# Patient Record
Sex: MALE | Race: BLACK OR AFRICAN AMERICAN | Employment: OTHER | URBAN - METROPOLITAN AREA
[De-identification: names, ages, dates, MRNs, and addresses within clinical notes are randomized per-mention and may not be internally consistent; named-entity substitution may affect disease eponyms.]

---

## 2017-02-06 ENCOUNTER — OFFICE VISIT (OUTPATIENT)
Dept: FAMILY MEDICINE CLINIC | Facility: CLINIC | Age: 67
End: 2017-02-06

## 2017-02-06 ENCOUNTER — LAB ENCOUNTER (OUTPATIENT)
Dept: LAB | Age: 67
End: 2017-02-06
Attending: FAMILY MEDICINE
Payer: MEDICARE

## 2017-02-06 VITALS
SYSTOLIC BLOOD PRESSURE: 130 MMHG | WEIGHT: 230 LBS | DIASTOLIC BLOOD PRESSURE: 96 MMHG | HEART RATE: 96 BPM | TEMPERATURE: 98 F | RESPIRATION RATE: 17 BRPM | HEIGHT: 68 IN | BODY MASS INDEX: 34.86 KG/M2

## 2017-02-06 DIAGNOSIS — J00 ACUTE NASOPHARYNGITIS: ICD-10-CM

## 2017-02-06 DIAGNOSIS — Z12.5 PROSTATE CANCER SCREENING: ICD-10-CM

## 2017-02-06 DIAGNOSIS — Z00.00 PREVENTATIVE HEALTH CARE: Primary | ICD-10-CM

## 2017-02-06 DIAGNOSIS — R94.31 ABNORMAL EKG: ICD-10-CM

## 2017-02-06 DIAGNOSIS — Z00.00 PREVENTATIVE HEALTH CARE: ICD-10-CM

## 2017-02-06 LAB
ALBUMIN SERPL BCP-MCNC: 4 G/DL (ref 3.5–4.8)
ALBUMIN/GLOB SERPL: 1.1 {RATIO} (ref 1–2)
ALP SERPL-CCNC: 92 U/L (ref 32–100)
ALT SERPL-CCNC: 20 U/L (ref 17–63)
ANION GAP SERPL CALC-SCNC: 7 MMOL/L (ref 0–18)
AST SERPL-CCNC: 18 U/L (ref 15–41)
BACTERIA UR QL AUTO: NEGATIVE /HPF
BASOPHILS # BLD: 0.1 K/UL (ref 0–0.2)
BASOPHILS NFR BLD: 1 %
BILIRUB SERPL-MCNC: 1.1 MG/DL (ref 0.3–1.2)
BILIRUB UR QL: NEGATIVE
BUN SERPL-MCNC: 11 MG/DL (ref 8–20)
BUN/CREAT SERPL: 8.1 (ref 10–20)
CALCIUM SERPL-MCNC: 9.7 MG/DL (ref 8.5–10.5)
CHLORIDE SERPL-SCNC: 104 MMOL/L (ref 95–110)
CHOLEST SERPL-MCNC: 237 MG/DL (ref 110–200)
CLARITY UR: CLEAR
CO2 SERPL-SCNC: 29 MMOL/L (ref 22–32)
COLOR UR: YELLOW
CREAT SERPL-MCNC: 1.36 MG/DL (ref 0.5–1.5)
EOSINOPHIL # BLD: 0.4 K/UL (ref 0–0.7)
EOSINOPHIL NFR BLD: 6 %
ERYTHROCYTE [DISTWIDTH] IN BLOOD BY AUTOMATED COUNT: 15.2 % (ref 11–15)
GLOBULIN PLAS-MCNC: 3.7 G/DL (ref 2.5–3.7)
GLUCOSE SERPL-MCNC: 100 MG/DL (ref 70–99)
GLUCOSE UR-MCNC: NEGATIVE MG/DL
HCT VFR BLD AUTO: 49.3 % (ref 41–52)
HDLC SERPL-MCNC: 43 MG/DL
HGB BLD-MCNC: 16.1 G/DL (ref 13.5–17.5)
KETONES UR-MCNC: NEGATIVE MG/DL
LDLC SERPL CALC-MCNC: 164 MG/DL (ref 0–99)
LEUKOCYTE ESTERASE UR QL STRIP.AUTO: NEGATIVE
LYMPHOCYTES # BLD: 2.6 K/UL (ref 1–4)
LYMPHOCYTES NFR BLD: 39 %
MCH RBC QN AUTO: 28.1 PG (ref 27–32)
MCHC RBC AUTO-ENTMCNC: 32.7 G/DL (ref 32–37)
MCV RBC AUTO: 85.7 FL (ref 80–100)
MONOCYTES # BLD: 1 K/UL (ref 0–1)
MONOCYTES NFR BLD: 15 %
NEUTROPHILS # BLD AUTO: 2.6 K/UL (ref 1.8–7.7)
NEUTROPHILS NFR BLD: 40 %
NITRITE UR QL STRIP.AUTO: NEGATIVE
NONHDLC SERPL-MCNC: 194 MG/DL
OSMOLALITY UR CALC.SUM OF ELEC: 289 MOSM/KG (ref 275–295)
PH UR: 5 [PH] (ref 5–8)
PLATELET # BLD AUTO: 177 K/UL (ref 140–400)
PMV BLD AUTO: 9.4 FL (ref 7.4–10.3)
POTASSIUM SERPL-SCNC: 4.6 MMOL/L (ref 3.3–5.1)
PROT SERPL-MCNC: 7.7 G/DL (ref 5.9–8.4)
PROT UR-MCNC: NEGATIVE MG/DL
PSA SERPL-MCNC: 2.7 NG/ML (ref 0–4)
RBC # BLD AUTO: 5.75 M/UL (ref 4.5–5.9)
RBC #/AREA URNS AUTO: 3 /HPF
SODIUM SERPL-SCNC: 140 MMOL/L (ref 136–144)
SP GR UR STRIP: 1.02 (ref 1–1.03)
TRIGL SERPL-MCNC: 152 MG/DL (ref 1–149)
TSH SERPL-ACNC: 0.69 UIU/ML (ref 0.34–5.6)
UROBILINOGEN UR STRIP-ACNC: <2
VIT C UR-MCNC: NEGATIVE MG/DL
WBC # BLD AUTO: 6.6 K/UL (ref 4–11)
WBC #/AREA URNS AUTO: 1 /HPF

## 2017-02-06 PROCEDURE — G0463 HOSPITAL OUTPT CLINIC VISIT: HCPCS | Performed by: FAMILY MEDICINE

## 2017-02-06 PROCEDURE — 99213 OFFICE O/P EST LOW 20 MIN: CPT | Performed by: FAMILY MEDICINE

## 2017-02-06 PROCEDURE — 93010 ELECTROCARDIOGRAM REPORT: CPT | Performed by: FAMILY MEDICINE

## 2017-02-06 PROCEDURE — G0439 PPPS, SUBSEQ VISIT: HCPCS | Performed by: FAMILY MEDICINE

## 2017-02-06 PROCEDURE — 85025 COMPLETE CBC W/AUTO DIFF WBC: CPT

## 2017-02-06 PROCEDURE — 36415 COLL VENOUS BLD VENIPUNCTURE: CPT

## 2017-02-06 PROCEDURE — 81001 URINALYSIS AUTO W/SCOPE: CPT

## 2017-02-06 PROCEDURE — 84443 ASSAY THYROID STIM HORMONE: CPT

## 2017-02-06 PROCEDURE — 80061 LIPID PANEL: CPT

## 2017-02-06 PROCEDURE — 93005 ELECTROCARDIOGRAM TRACING: CPT | Performed by: FAMILY MEDICINE

## 2017-02-06 PROCEDURE — 80053 COMPREHEN METABOLIC PANEL: CPT

## 2017-02-06 RX ORDER — AZITHROMYCIN 250 MG/1
TABLET, FILM COATED ORAL
Qty: 6 TABLET | Refills: 0 | Status: SHIPPED | OUTPATIENT
Start: 2017-02-06 | End: 2017-02-11

## 2017-02-06 RX ORDER — LORATADINE 10 MG/1
10 TABLET ORAL DAILY
Qty: 30 TABLET | Refills: 0 | Status: ON HOLD | OUTPATIENT
Start: 2017-02-06 | End: 2017-04-24

## 2017-02-06 NOTE — PATIENT INSTRUCTIONS
Clear fluid hydration. Rest. Take all medications as prescribed. If symptoms persist or worsen please call or return to clinic ASAP. All adult screening ordered and done appropriate for patient's age and gender and risk factors and complaints.  Recommend we

## 2017-02-08 NOTE — PROGRESS NOTES
HPI:    Patient ID: Queen Sarina is a 74year old male. HPI Comments: 74year old AA male here for preventive care. Only current complaint of cold symptoms over the last 2 weeks. Semi retired .  Bowel function normal. Urine stream is slower sounds normal. No respiratory distress. Abdominal: Soft. There is no tenderness. Neurological: He is alert and oriented to person, place, and time. ASSESSMENT/PLAN:   1.  Preventative health care  Ordered and done; EKG is abnormal and will healthy dietary changes. Zithromax and loratadine prescribed. Return in about 3 weeks (around 2/27/2017), or if symptoms worsen or fail to improve.          #[id number]

## 2017-02-11 ENCOUNTER — TELEPHONE (OUTPATIENT)
Dept: FAMILY MEDICINE CLINIC | Facility: CLINIC | Age: 67
End: 2017-02-11

## 2017-02-11 NOTE — TELEPHONE ENCOUNTER
----- Message from Army Jennifer DO sent at 2/8/2017  3:57 PM CST -----  All labs reviewed and are normal except increased lipids. Enough to medicate in lieu of obesity and abnormal EKG (prescripton sent to pharmacy for statin along with baby asa).  B

## 2017-02-11 NOTE — TELEPHONE ENCOUNTER
Left message to call back. MATT, Please transfer to [phone] until 130 today, then [phone] anytime. Thanks.

## 2017-02-15 NOTE — TELEPHONE ENCOUNTER
Also reviewed EKG results with pt, pt will schedule test for when he returns to area some time next week.

## 2017-02-15 NOTE — TELEPHONE ENCOUNTER
Pt returned call, verified name and . Reviewed test results and recommendations with pt per doctor's instructions.  Advised pt to decrease red meat, fried or fast foods in diet and to eat more lean meats, poultry, fresh fruits and vegetables, and more fo

## 2017-02-27 ENCOUNTER — HOSPITAL ENCOUNTER (OUTPATIENT)
Dept: CV DIAGNOSTICS | Facility: HOSPITAL | Age: 67
Discharge: HOME | End: 2017-02-27
Attending: FAMILY MEDICINE
Payer: MEDICARE

## 2017-02-27 DIAGNOSIS — R94.31 ABNORMAL EKG: ICD-10-CM

## 2017-02-27 PROCEDURE — 93306 TTE W/DOPPLER COMPLETE: CPT

## 2017-02-27 PROCEDURE — 93306 TTE W/DOPPLER COMPLETE: CPT | Performed by: INTERNAL MEDICINE

## 2017-03-02 NOTE — TELEPHONE ENCOUNTER
Pt's wife called back inquiring about echo results. Notes Recorded by Shireen Phalen, RN on 3/2/2017 at 10:48 AM  Spoke with pt informed test results per Dr Reg Tang. Pt verbalized understanding, requested referral be mailed to home. Referral mailed.   ------

## 2017-03-16 ENCOUNTER — TELEPHONE (OUTPATIENT)
Dept: FAMILY MEDICINE CLINIC | Facility: CLINIC | Age: 67
End: 2017-03-16

## 2017-03-17 NOTE — TELEPHONE ENCOUNTER
Appt scheduled 3/20 as advised. Pt states he noted some blood in his nasal drainage when he blew his nose this morning, no bleeding noted since that occurrence. Advised pt to use some nasal saline spray and humidifier. Pt agreed with plan of care.

## 2017-03-20 ENCOUNTER — OFFICE VISIT (OUTPATIENT)
Dept: FAMILY MEDICINE CLINIC | Facility: CLINIC | Age: 67
End: 2017-03-20

## 2017-03-20 VITALS
RESPIRATION RATE: 17 BRPM | BODY MASS INDEX: 34.86 KG/M2 | HEIGHT: 68 IN | HEART RATE: 73 BPM | SYSTOLIC BLOOD PRESSURE: 158 MMHG | TEMPERATURE: 98 F | WEIGHT: 230 LBS | DIASTOLIC BLOOD PRESSURE: 104 MMHG

## 2017-03-20 DIAGNOSIS — R20.2 NUMBNESS AND TINGLING OF RIGHT FACE: ICD-10-CM

## 2017-03-20 DIAGNOSIS — R73.9 HYPERGLYCEMIA: ICD-10-CM

## 2017-03-20 DIAGNOSIS — E78.5 HYPERLIPIDEMIA, UNSPECIFIED HYPERLIPIDEMIA TYPE: ICD-10-CM

## 2017-03-20 DIAGNOSIS — I10 ESSENTIAL HYPERTENSION: Primary | ICD-10-CM

## 2017-03-20 DIAGNOSIS — R20.0 NUMBNESS AND TINGLING OF RIGHT FACE: ICD-10-CM

## 2017-03-20 PROCEDURE — 99214 OFFICE O/P EST MOD 30 MIN: CPT | Performed by: FAMILY MEDICINE

## 2017-03-20 PROCEDURE — G0463 HOSPITAL OUTPT CLINIC VISIT: HCPCS | Performed by: FAMILY MEDICINE

## 2017-03-20 RX ORDER — DEXTROMETHORPHAN HYDROBROMIDE AND PROMETHAZINE HYDROCHLORIDE 15; 6.25 MG/5ML; MG/5ML
5 SYRUP ORAL 4 TIMES DAILY PRN
Qty: 240 ML | Refills: 0 | Status: SHIPPED | OUTPATIENT
Start: 2017-03-20 | End: 2017-04-20

## 2017-03-20 RX ORDER — LOSARTAN POTASSIUM AND HYDROCHLOROTHIAZIDE 12.5; 5 MG/1; MG/1
1 TABLET ORAL DAILY
Qty: 30 TABLET | Refills: 2 | Status: SHIPPED | OUTPATIENT
Start: 2017-03-20 | End: 2017-06-11

## 2017-03-20 NOTE — PATIENT INSTRUCTIONS
Increase water intake. Recommend probiotics. Promethazine DM prescribed. Consider fluticasone nasal spray. Keep cardiology appointment. Start Losartan HCTZ 50/12.5 mg mg orally daily. Will likely hold ASA and start Plavix.  Will attempt to move cardiology a

## 2017-03-20 NOTE — PROGRESS NOTES
Joey Veras is a 74year old former smoker male who presents for multiple issues.      1. Cough with chest pain: He has chest congestion cough and he can't expel everything, he starts to have left sided pressure like chest pain, which resolves after a few minute 73  Temp(Src) 98.1 °F (36.7 °C) (Oral)  Resp 17  Ht 5' 8\" (1.727 m)  Wt 230 lb (104.327 kg)  BMI 34.98 kg/m2    Gen:  Well-nourished. No distress. HEENT: PERRLA, conjunctive clear. Asif ear canals clear. Asif TMs intact with good landmarks noted.   Nares The problem has been resolved. Associated symptoms include numbness. Pertinent negatives include no chest pain or headaches. Exacerbated by: undetermined. He has tried nothing for the symptoms. Improvement on treatment: total self resolution.        Review person, place, and time. He has normal reflexes. He displays normal reflexes. No cranial nerve deficit. He exhibits normal muscle tone. Coordination normal.              ASSESSMENT/PLAN:   1.  Essential hypertension  Not to goal; Start Losartan/HCTZ @ 50/12

## 2017-03-24 ENCOUNTER — TELEPHONE (OUTPATIENT)
Dept: FAMILY MEDICINE CLINIC | Facility: CLINIC | Age: 67
End: 2017-03-24

## 2017-03-24 NOTE — TELEPHONE ENCOUNTER
Pt seen Dr. Brandie Boyer on 3/20. Pt stts he was informed by  To call the office today to discuss medication. Please advise.

## 2017-03-28 NOTE — TELEPHONE ENCOUNTER
Recommend continuing losartan/HCT until follow-up appointment with Dr. Herlinda Richards on 4/20. Promethazine DM is more likely to be cause for drowsiness.

## 2017-03-28 NOTE — TELEPHONE ENCOUNTER
Pt states he was placed on a med for BP, Losartan-HCTZ. Pt states he has some nausea after taking med. States he has less nausea now that he is taking med with food. Pt also states he has some drowsiness after taking med.  Pt is also taking Promethazine DM

## 2017-03-28 NOTE — TELEPHONE ENCOUNTER
Reviewed doctor's recommendations with pt. Pt agreed with plan of care, declined scheduling follow up appt at this time, states he needs to call back to schedule.  Also advised pt to call back if no improvement in symptoms, pt agreed with advice given and h

## 2017-04-20 ENCOUNTER — LAB ENCOUNTER (OUTPATIENT)
Dept: LAB | Age: 67
End: 2017-04-20
Attending: INTERNAL MEDICINE
Payer: MEDICARE

## 2017-04-20 ENCOUNTER — HOSPITAL ENCOUNTER (OUTPATIENT)
Dept: GENERAL RADIOLOGY | Age: 67
Discharge: HOME | End: 2017-04-20
Attending: INTERNAL MEDICINE | Admitting: INTERNAL MEDICINE
Payer: MEDICARE

## 2017-04-20 ENCOUNTER — APPOINTMENT (OUTPATIENT)
Dept: LAB | Age: 67
End: 2017-04-20
Attending: INTERNAL MEDICINE
Payer: MEDICARE

## 2017-04-20 ENCOUNTER — OFFICE VISIT (OUTPATIENT)
Dept: CARDIOLOGY CLINIC | Facility: CLINIC | Age: 67
End: 2017-04-20

## 2017-04-20 VITALS
WEIGHT: 236 LBS | DIASTOLIC BLOOD PRESSURE: 82 MMHG | RESPIRATION RATE: 18 BRPM | HEIGHT: 68 IN | HEART RATE: 72 BPM | SYSTOLIC BLOOD PRESSURE: 126 MMHG | BODY MASS INDEX: 35.77 KG/M2

## 2017-04-20 DIAGNOSIS — I10 ESSENTIAL HYPERTENSION: ICD-10-CM

## 2017-04-20 DIAGNOSIS — I42.9 CARDIOMYOPATHY, UNSPECIFIED TYPE (HCC): ICD-10-CM

## 2017-04-20 DIAGNOSIS — I42.9 CARDIOMYOPATHY, UNSPECIFIED TYPE (HCC): Primary | ICD-10-CM

## 2017-04-20 DIAGNOSIS — R73.9 HYPERGLYCEMIA: ICD-10-CM

## 2017-04-20 DIAGNOSIS — I42.9 FAMILIAL CARDIOMYOPATHY (HCC): Primary | ICD-10-CM

## 2017-04-20 DIAGNOSIS — I45.2 RBBB (RIGHT BUNDLE BRANCH BLOCK WITH LEFT ANTERIOR FASCICULAR BLOCK): ICD-10-CM

## 2017-04-20 PROCEDURE — 71020 XR CHEST PA + LAT CHEST (CPT=71020): CPT

## 2017-04-20 PROCEDURE — G0463 HOSPITAL OUTPT CLINIC VISIT: HCPCS | Performed by: INTERNAL MEDICINE

## 2017-04-20 PROCEDURE — 93005 ELECTROCARDIOGRAM TRACING: CPT

## 2017-04-20 PROCEDURE — 93010 ELECTROCARDIOGRAM REPORT: CPT

## 2017-04-20 PROCEDURE — 99205 OFFICE O/P NEW HI 60 MIN: CPT | Performed by: INTERNAL MEDICINE

## 2017-04-20 RX ORDER — METOPROLOL SUCCINATE 25 MG/1
12.5 TABLET, EXTENDED RELEASE ORAL 2 TIMES DAILY
Qty: 120 TABLET | Refills: 4 | Status: SHIPPED | OUTPATIENT
Start: 2017-04-20 | End: 2017-06-15

## 2017-04-20 NOTE — PROGRESS NOTES
[facility]    Cardiac Electrophysiology Consultation    Name:  Natacha Armenta  : 1950    Date of consultation:   2017    Referring physician: Dr. Toshia Verma    Reason for Consultation:  Chest pain, abnormal echo    History of Present Ill tablet (81 mg total) by mouth daily. Disp: 100 tablet Rfl: 3   loratadine 10 MG Oral Tab Take 1 tablet (10 mg total) by mouth daily.  Disp: 30 tablet Rfl: 0   TraMADol HCl (ULTRAM) 50 MG Oral Tab Take 1 tablet by mouth every 6 (six) hours as needed for Pain We discussed potential causes, goals for treatment, and the initiation of therapies. - He was just started on an ARB, and we will maintain this.   - Add metoprolol succinate 12.5 mg twice a day now  - Given his chest pain, his ECG suggesting prior infarct,

## 2017-04-20 NOTE — PATIENT INSTRUCTIONS
-- start Toprol XL 12.5 mg two times a day  -- cardiac catheterization to be scheduled at hospital  -- see Nurse [facility] in 2 weeks, then follow-up with her again 2-4 weeks later  -- see Dr Duc King in 6-8 weeks

## 2017-04-21 RX ORDER — SODIUM CHLORIDE 9 MG/ML
INJECTION, SOLUTION INTRAVENOUS ONCE
Status: COMPLETED | OUTPATIENT
Start: 2017-04-24 | End: 2017-04-24

## 2017-04-24 ENCOUNTER — HOSPITAL ENCOUNTER (OUTPATIENT)
Dept: INTERVENTIONAL RADIOLOGY/VASCULAR | Facility: HOSPITAL | Age: 67
Discharge: HOME | End: 2017-04-24
Attending: INTERNAL MEDICINE | Admitting: INTERNAL MEDICINE
Payer: MEDICARE

## 2017-04-24 VITALS
SYSTOLIC BLOOD PRESSURE: 122 MMHG | HEART RATE: 69 BPM | WEIGHT: 236 LBS | RESPIRATION RATE: 21 BRPM | HEIGHT: 68 IN | BODY MASS INDEX: 35.77 KG/M2 | OXYGEN SATURATION: 99 % | DIASTOLIC BLOOD PRESSURE: 71 MMHG

## 2017-04-24 DIAGNOSIS — R07.9 CHEST PAIN: ICD-10-CM

## 2017-04-24 PROCEDURE — B2111ZZ FLUOROSCOPY OF MULTIPLE CORONARY ARTERIES USING LOW OSMOLAR CONTRAST: ICD-10-PCS | Performed by: INTERNAL MEDICINE

## 2017-04-24 PROCEDURE — 93458 L HRT ARTERY/VENTRICLE ANGIO: CPT

## 2017-04-24 PROCEDURE — 4A023N7 MEASUREMENT OF CARDIAC SAMPLING AND PRESSURE, LEFT HEART, PERCUTANEOUS APPROACH: ICD-10-PCS | Performed by: INTERNAL MEDICINE

## 2017-04-24 PROCEDURE — B2151ZZ FLUOROSCOPY OF LEFT HEART USING LOW OSMOLAR CONTRAST: ICD-10-PCS | Performed by: INTERNAL MEDICINE

## 2017-04-24 RX ORDER — SODIUM CHLORIDE 9 MG/ML
INJECTION, SOLUTION INTRAVENOUS CONTINUOUS
Status: DISCONTINUED | OUTPATIENT
Start: 2017-04-24 | End: 2017-04-24

## 2017-04-24 RX ORDER — LIDOCAINE HYDROCHLORIDE 20 MG/ML
INJECTION, SOLUTION EPIDURAL; INFILTRATION; INTRACAUDAL; PERINEURAL
Status: COMPLETED
Start: 2017-04-24 | End: 2017-04-24

## 2017-04-24 RX ORDER — MIDAZOLAM HYDROCHLORIDE 1 MG/ML
INJECTION INTRAMUSCULAR; INTRAVENOUS
Status: COMPLETED
Start: 2017-04-24 | End: 2017-04-24

## 2017-04-24 RX ORDER — VERAPAMIL HYDROCHLORIDE 2.5 MG/ML
INJECTION, SOLUTION INTRAVENOUS
Status: COMPLETED
Start: 2017-04-24 | End: 2017-04-24

## 2017-04-24 RX ORDER — HEPARIN SODIUM 1000 [USP'U]/ML
INJECTION, SOLUTION INTRAVENOUS; SUBCUTANEOUS
Status: COMPLETED
Start: 2017-04-24 | End: 2017-04-24

## 2017-04-24 RX ORDER — MIDAZOLAM HYDROCHLORIDE 1 MG/ML
2 INJECTION INTRAMUSCULAR; INTRAVENOUS EVERY 5 MIN PRN
Status: DISCONTINUED | OUTPATIENT
Start: 2017-04-24 | End: 2017-04-24

## 2017-04-24 RX ORDER — NITROGLYCERIN 20 MG/100ML
INJECTION INTRAVENOUS
Status: COMPLETED
Start: 2017-04-24 | End: 2017-04-24

## 2017-04-24 RX ORDER — SODIUM CHLORIDE 9 MG/ML
INJECTION, SOLUTION INTRAVENOUS
Status: COMPLETED
Start: 2017-04-24 | End: 2017-04-24

## 2017-04-24 RX ORDER — HEPARIN SODIUM 1000 [USP'U]/ML
5000 INJECTION, SOLUTION INTRAVENOUS; SUBCUTANEOUS ONCE
Status: COMPLETED | OUTPATIENT
Start: 2017-04-24 | End: 2017-04-24

## 2017-04-24 RX ADMIN — HEPARIN SODIUM 5000 UNITS: 1000 INJECTION, SOLUTION INTRAVENOUS; SUBCUTANEOUS at 11:52:00

## 2017-04-24 RX ADMIN — SODIUM CHLORIDE: 9 INJECTION, SOLUTION INTRAVENOUS at 11:04:00

## 2017-04-24 RX ADMIN — MIDAZOLAM HYDROCHLORIDE 3 MG: 1 INJECTION INTRAMUSCULAR; INTRAVENOUS at 11:50:00

## 2017-04-24 NOTE — BRIEF PROCEDURE NOTE
[facility] - [facility]    Brief Cardiac Cath Note  Risa Davalos Patient Status:  Outpatient in a Bed    1950 MRN [id number]   Location [facility] Attending Lauri Roy MD   Hosp Day # 0 PCP Megha Rodriguez

## 2017-04-24 NOTE — PROGRESS NOTES
Post cardiac cath note. Right wrist wrist band applied and sheath removed. 10 cc air residual noted in band at this time.    Transferred to ccl holding room 1

## 2017-05-01 ENCOUNTER — OFFICE VISIT (OUTPATIENT)
Dept: CARDIOLOGY CLINIC | Facility: CLINIC | Age: 67
End: 2017-05-01

## 2017-05-01 VITALS
HEIGHT: 68 IN | DIASTOLIC BLOOD PRESSURE: 78 MMHG | HEART RATE: 72 BPM | WEIGHT: 236 LBS | SYSTOLIC BLOOD PRESSURE: 120 MMHG | RESPIRATION RATE: 18 BRPM | BODY MASS INDEX: 35.77 KG/M2

## 2017-05-01 DIAGNOSIS — I42.9 CARDIOMYOPATHY, UNSPECIFIED TYPE (HCC): Primary | ICD-10-CM

## 2017-05-01 PROCEDURE — G0463 HOSPITAL OUTPT CLINIC VISIT: HCPCS | Performed by: NURSE PRACTITIONER

## 2017-05-01 PROCEDURE — 99213 OFFICE O/P EST LOW 20 MIN: CPT | Performed by: NURSE PRACTITIONER

## 2017-05-01 NOTE — PATIENT INSTRUCTIONS
1. Increase metoprolol succinate to 25mg twice a day  2. Weigh daily call wit wt increase 3 lbs in 1 day or 5 lbs in 1 wk  3.  Come back in 2-3 wks for BP check

## 2017-05-01 NOTE — PROGRESS NOTES
Jacki Luo is a 74year old male. Patient presents with:  Cardiomyopathy  CHF  Hypertension  Hyperlipidemia  Hospital F/U: cath  Dyspnea: increased in past few days   Chest Pain: left sided chest pain    HPI:   Patient comes in today for a checkup aft 74  Resp 18  Ht 5' 8\" (1.727 m)  Wt 236 lb (107.049 kg)  BMI 35.89 kg/m2  GENERAL: well developed, well nourished,in no apparent distress  SKIN: no rashes,no suspicious lesions  HEENT: atraumatic, normocephalic,ears and throat are clear  NECK: supple,no a

## 2017-05-06 ENCOUNTER — TELEPHONE (OUTPATIENT)
Dept: FAMILY MEDICINE CLINIC | Facility: CLINIC | Age: 67
End: 2017-05-06

## 2017-05-06 DIAGNOSIS — R73.9 HYPERGLYCEMIA: Primary | ICD-10-CM

## 2017-05-06 DIAGNOSIS — E66.9 NON MORBID OBESITY, UNSPECIFIED OBESITY TYPE: ICD-10-CM

## 2017-05-06 NOTE — TELEPHONE ENCOUNTER
[phone]   ----- Message from Deja Peoples DO sent at 4/21/2017  7:45 PM CDT -----  A1c has patient right at the border regarding diabetes. Needs dietary referral (see if patient is agreeable). Weight loss needed.

## 2017-05-10 NOTE — TELEPHONE ENCOUNTER
Reviewed 4/20/17 lab results with pt along with Dr Santiago Cea recommendations. Pt agreeable to see a dietician. Dietician referral pended for md completion and signoff.

## 2017-05-12 NOTE — TELEPHONE ENCOUNTER
Referral still in \"open\" status, awaiting authorization from [facility].   Managed care to f/u with pt after approval

## 2017-05-15 ENCOUNTER — TELEPHONE (OUTPATIENT)
Dept: FAMILY MEDICINE CLINIC | Facility: CLINIC | Age: 67
End: 2017-05-15

## 2017-05-15 NOTE — TELEPHONE ENCOUNTER
Doc can you see pended letter for excuse from [address]?  Pt has recently been in the hospital with Chest XRay, Cardio echo, chest pain etc.

## 2017-05-15 NOTE — TELEPHONE ENCOUNTER
Pt is being called in for jury duty. Pt's wife asking if Dr. Jennifer Lane can write an letter stating pt can not do jury duty due to health reasons? Please advise.

## 2017-05-16 ENCOUNTER — TELEPHONE (OUTPATIENT)
Dept: FAMILY MEDICINE CLINIC | Facility: CLINIC | Age: 67
End: 2017-05-16

## 2017-05-16 NOTE — TELEPHONE ENCOUNTER
----- Message from Jessica Staton DO sent at 4/29/2017  5:48 PM CDT -----  No significant blockage on cath.

## 2017-05-18 RX ORDER — ATORVASTATIN CALCIUM 40 MG/1
TABLET, FILM COATED ORAL
Qty: 30 TABLET | Refills: 0 | Status: SHIPPED | OUTPATIENT
Start: 2017-05-18 | End: 2017-06-15

## 2017-05-22 ENCOUNTER — OFFICE VISIT (OUTPATIENT)
Dept: CARDIOLOGY CLINIC | Facility: CLINIC | Age: 67
End: 2017-05-22

## 2017-05-22 VITALS
DIASTOLIC BLOOD PRESSURE: 70 MMHG | SYSTOLIC BLOOD PRESSURE: 110 MMHG | WEIGHT: 236 LBS | HEART RATE: 64 BPM | BODY MASS INDEX: 36 KG/M2

## 2017-05-22 DIAGNOSIS — E78.5 DYSLIPIDEMIA: Primary | ICD-10-CM

## 2017-05-22 DIAGNOSIS — I42.9 CARDIOMYOPATHY, UNSPECIFIED TYPE (HCC): ICD-10-CM

## 2017-05-22 PROCEDURE — 99213 OFFICE O/P EST LOW 20 MIN: CPT | Performed by: NURSE PRACTITIONER

## 2017-05-22 PROCEDURE — G0463 HOSPITAL OUTPT CLINIC VISIT: HCPCS | Performed by: NURSE PRACTITIONER

## 2017-05-22 NOTE — PROGRESS NOTES
Jaison Retana is a 74year old male. Patient presents with:  Cardiomyopathy: F/u    HPI:   Patient comes in today for a checkup for his cardiomyopathy.   He saw Dr. Sun Norman for some chest congestion and shortness of breath echocardiogram showed EF around 30- abdominal pain and denies heartburn  NEURO: denies headaches    EXAM:   /70 mmHg  Pulse 64  Wt 236 lb (107.049 kg)  GENERAL: well developed, well nourished,in no apparent distress  SKIN: no rashes,no suspicious lesions  HEENT: atraumatic, normocephal

## 2017-05-22 NOTE — PATIENT INSTRUCTIONS
1. Continue same medications at same dose  2. Weigh daily call if wt gain of 3 lbs in 1 day or 5 lbs in 1 wk  5. Avoid salt

## 2017-06-12 RX ORDER — LOSARTAN POTASSIUM AND HYDROCHLOROTHIAZIDE 12.5; 5 MG/1; MG/1
TABLET ORAL
Qty: 30 TABLET | Refills: 0 | Status: SHIPPED | OUTPATIENT
Start: 2017-06-12 | End: 2017-07-12

## 2017-06-15 ENCOUNTER — OFFICE VISIT (OUTPATIENT)
Dept: CARDIOLOGY CLINIC | Facility: CLINIC | Age: 67
End: 2017-06-15

## 2017-06-15 VITALS
WEIGHT: 236 LBS | RESPIRATION RATE: 18 BRPM | SYSTOLIC BLOOD PRESSURE: 106 MMHG | HEIGHT: 68 IN | DIASTOLIC BLOOD PRESSURE: 72 MMHG | BODY MASS INDEX: 35.77 KG/M2 | HEART RATE: 82 BPM

## 2017-06-15 DIAGNOSIS — I50.22 SYSTOLIC CHF, CHRONIC (HCC): ICD-10-CM

## 2017-06-15 DIAGNOSIS — E66.09 OBESITY DUE TO EXCESS CALORIES, UNSPECIFIED OBESITY SEVERITY: ICD-10-CM

## 2017-06-15 DIAGNOSIS — I42.0 DILATED CARDIOMYOPATHY (HCC): ICD-10-CM

## 2017-06-15 DIAGNOSIS — I10 ESSENTIAL HYPERTENSION: Primary | ICD-10-CM

## 2017-06-15 PROBLEM — E66.9 OBESITY: Status: ACTIVE | Noted: 2017-06-15

## 2017-06-15 PROCEDURE — 99213 OFFICE O/P EST LOW 20 MIN: CPT | Performed by: INTERNAL MEDICINE

## 2017-06-15 PROCEDURE — G0463 HOSPITAL OUTPT CLINIC VISIT: HCPCS | Performed by: INTERNAL MEDICINE

## 2017-06-15 RX ORDER — METOPROLOL SUCCINATE 25 MG/1
25 TABLET, EXTENDED RELEASE ORAL 2 TIMES DAILY
COMMUNITY
End: 2017-06-15

## 2017-06-15 RX ORDER — ATORVASTATIN CALCIUM 40 MG/1
80 TABLET, FILM COATED ORAL NIGHTLY
Qty: 30 TABLET | Refills: 0 | Status: SHIPPED | OUTPATIENT
Start: 2017-06-15 | End: 2017-06-15

## 2017-06-15 RX ORDER — ATORVASTATIN CALCIUM 80 MG/1
80 TABLET, FILM COATED ORAL NIGHTLY
Qty: 30 TABLET | Refills: 0 | Status: SHIPPED | OUTPATIENT
Start: 2017-06-15 | End: 2017-06-15 | Stop reason: CLARIF

## 2017-06-15 RX ORDER — ATORVASTATIN CALCIUM 80 MG/1
80 TABLET, FILM COATED ORAL DAILY
Qty: 90 TABLET | Refills: 0 | Status: SHIPPED | OUTPATIENT
Start: 2017-06-15 | End: 2017-09-05

## 2017-06-15 RX ORDER — METOPROLOL SUCCINATE 25 MG/1
25 TABLET, EXTENDED RELEASE ORAL 2 TIMES DAILY
Qty: 180 TABLET | Refills: 0 | Status: SHIPPED | OUTPATIENT
Start: 2017-06-15 | End: 2017-09-25

## 2017-06-15 NOTE — PROGRESS NOTES
[facility]    Cardiac Electrophysiology Progress Note        HPI:   Janell Keith is a 74year old is here to follow-up his cardiomyopathy. He had an angiogram showing no significant coronary atherosclerosis.   His ejection fraction at that time wa hematuria  NEURO: denies headaches, focal weaknesses or paresthesias    PHYSICAL EXAM:   Vital Signs: /72 mmHg  Pulse 82  Resp 18  Ht 5' 8\" (1.727 m)  Wt 236 lb (107.049 kg)  BMI 35.89 kg/m2  General: Comfortable, well-nourished, in no acute distres

## 2017-06-15 NOTE — PATIENT INSTRUCTIONS
- increase Lipitor to 80 mg nightly  - recheck cholesterol in 2-3 months  - schedule Cardiac Rehab  - see Nurse [facility] in 6 months  - see Dr Erika Gerard in 1 year

## 2017-07-13 RX ORDER — LOSARTAN POTASSIUM AND HYDROCHLOROTHIAZIDE 12.5; 5 MG/1; MG/1
TABLET ORAL
Qty: 30 TABLET | Refills: 0 | Status: SHIPPED | OUTPATIENT
Start: 2017-07-13 | End: 2017-08-07

## 2017-07-13 NOTE — TELEPHONE ENCOUNTER
Hypertensive Medications: Refilled per protocol    Protocol Criteria:  · Appointment scheduled in the past 6 months or in the next 3 months  · BMP or CMP in the past 12 months  · Creatinine result < 2  Recent Outpatient Visits            4 weeks ago [facility]

## 2017-08-12 RX ORDER — LOSARTAN POTASSIUM AND HYDROCHLOROTHIAZIDE 12.5; 5 MG/1; MG/1
TABLET ORAL
Qty: 30 TABLET | Refills: 0 | Status: SHIPPED | OUTPATIENT
Start: 2017-08-12 | End: 2017-09-05

## 2017-08-12 NOTE — TELEPHONE ENCOUNTER
Hypertensive Medications  Protocol Criteria:  · Appointment scheduled in the past 6 months or in the next 3 months  · BMP or CMP in the past 12 months  · Creatinine result < 2  Recent Outpatient Visits            1 month ago Essential hypertension    [place]

## 2017-09-05 RX ORDER — LOSARTAN POTASSIUM AND HYDROCHLOROTHIAZIDE 12.5; 5 MG/1; MG/1
TABLET ORAL
Qty: 30 TABLET | Refills: 0 | Status: SHIPPED | OUTPATIENT
Start: 2017-09-05 | End: 2017-09-05

## 2017-09-05 RX ORDER — ATORVASTATIN CALCIUM 80 MG/1
TABLET, FILM COATED ORAL
Qty: 90 TABLET | Refills: 0 | Status: SHIPPED | OUTPATIENT
Start: 2017-09-05 | End: 2017-12-07

## 2017-09-06 RX ORDER — LOSARTAN POTASSIUM AND HYDROCHLOROTHIAZIDE 12.5; 5 MG/1; MG/1
TABLET ORAL
Qty: 90 TABLET | Refills: 0 | Status: SHIPPED | OUTPATIENT
Start: 2017-09-06 | End: 2017-12-02

## 2017-09-25 ENCOUNTER — TELEPHONE (OUTPATIENT)
Dept: CARDIOLOGY CLINIC | Facility: CLINIC | Age: 67
End: 2017-09-25

## 2017-09-25 RX ORDER — METOPROLOL SUCCINATE 25 MG/1
25 TABLET, EXTENDED RELEASE ORAL 2 TIMES DAILY
Qty: 180 TABLET | Refills: 1 | Status: SHIPPED | OUTPATIENT
Start: 2017-09-25 | End: 2018-09-27

## 2017-12-02 RX ORDER — LOSARTAN POTASSIUM AND HYDROCHLOROTHIAZIDE 12.5; 5 MG/1; MG/1
TABLET ORAL
Qty: 90 TABLET | Refills: 0 | Status: SHIPPED | OUTPATIENT
Start: 2017-12-02 | End: 2018-03-05

## 2017-12-07 ENCOUNTER — TELEPHONE (OUTPATIENT)
Dept: CARDIOLOGY CLINIC | Facility: CLINIC | Age: 67
End: 2017-12-07

## 2017-12-07 RX ORDER — ATORVASTATIN CALCIUM 80 MG/1
80 TABLET, FILM COATED ORAL
Qty: 90 TABLET | Refills: 0 | Status: SHIPPED | OUTPATIENT
Start: 2017-12-07 | End: 2018-04-06

## 2017-12-07 NOTE — TELEPHONE ENCOUNTER
[facility] requesting 90 days refills for Atorvastatin. PLs call. Thank you.       Current Outpatient Prescriptions:  ATORVASTATIN 80 MG Oral Tab TAKE 1 TABLET BY MOUTH DAILY Disp: 90 tablet Rfl: 0

## 2017-12-08 ENCOUNTER — APPOINTMENT (OUTPATIENT)
Dept: LAB | Age: 67
End: 2017-12-08
Attending: INTERNAL MEDICINE
Payer: MEDICARE

## 2017-12-08 DIAGNOSIS — I50.22 SYSTOLIC CHF, CHRONIC (HCC): ICD-10-CM

## 2017-12-08 DIAGNOSIS — I42.0 DILATED CARDIOMYOPATHY (HCC): ICD-10-CM

## 2017-12-08 DIAGNOSIS — E66.09 OBESITY DUE TO EXCESS CALORIES, UNSPECIFIED OBESITY SEVERITY: ICD-10-CM

## 2017-12-08 DIAGNOSIS — I10 ESSENTIAL HYPERTENSION: ICD-10-CM

## 2017-12-08 DIAGNOSIS — E78.5 DYSLIPIDEMIA: ICD-10-CM

## 2017-12-08 PROCEDURE — 80061 LIPID PANEL: CPT

## 2017-12-08 PROCEDURE — 36415 COLL VENOUS BLD VENIPUNCTURE: CPT

## 2017-12-08 PROCEDURE — 84460 ALANINE AMINO (ALT) (SGPT): CPT

## 2017-12-08 PROCEDURE — 84450 TRANSFERASE (AST) (SGOT): CPT

## 2018-01-11 ENCOUNTER — OFFICE VISIT (OUTPATIENT)
Dept: FAMILY MEDICINE CLINIC | Facility: CLINIC | Age: 68
End: 2018-01-11

## 2018-01-11 VITALS
SYSTOLIC BLOOD PRESSURE: 110 MMHG | BODY MASS INDEX: 35.77 KG/M2 | RESPIRATION RATE: 17 BRPM | HEART RATE: 82 BPM | TEMPERATURE: 99 F | HEIGHT: 68 IN | DIASTOLIC BLOOD PRESSURE: 80 MMHG | WEIGHT: 236 LBS

## 2018-01-11 DIAGNOSIS — R05.9 COUGH: ICD-10-CM

## 2018-01-11 DIAGNOSIS — R05.4 COUGH SYNCOPE: ICD-10-CM

## 2018-01-11 DIAGNOSIS — I10 ESSENTIAL HYPERTENSION: Primary | ICD-10-CM

## 2018-01-11 DIAGNOSIS — G56.23 ULNAR NEUROPATHY OF BOTH UPPER EXTREMITIES: ICD-10-CM

## 2018-01-11 PROCEDURE — 99214 OFFICE O/P EST MOD 30 MIN: CPT | Performed by: FAMILY MEDICINE

## 2018-01-11 PROCEDURE — G0463 HOSPITAL OUTPT CLINIC VISIT: HCPCS | Performed by: FAMILY MEDICINE

## 2018-01-11 RX ORDER — LORATADINE 10 MG/1
10 TABLET ORAL DAILY
Qty: 30 TABLET | Refills: 2 | Status: SHIPPED | OUTPATIENT
Start: 2018-01-11 | End: 2018-11-15

## 2018-01-11 NOTE — PATIENT INSTRUCTIONS
Recommend weight loss via daily exercising and consistent healthy dietary changes. Monitor blood pressures and record at home. Limit salt intake. Comply with medications. Medication reviewed and renewed where needed and appropriate.   Consider neck and e

## 2018-01-11 NOTE — PROGRESS NOTES
HPI:    Patient ID: James Vela is a 74year old male. Hypertension   This is a chronic problem. The current episode started more than 1 year ago. The problem is controlled.  Pertinent negatives include no orthopnea, palpitations, peripheral edema o (1.727 m)          Body mass index is 35.88 kg/m². PHYSICAL EXAM:   Physical Exam    Constitutional: He is oriented to person, place, and time and obese.    HENT:   Right Ear: Tympanic membrane and ear canal normal.   Left Ear: Tympanic membrane and ear

## 2018-03-05 RX ORDER — LOSARTAN POTASSIUM AND HYDROCHLOROTHIAZIDE 12.5; 5 MG/1; MG/1
TABLET ORAL
Qty: 30 TABLET | Refills: 2 | Status: SHIPPED | OUTPATIENT
Start: 2018-03-05 | End: 2018-05-04

## 2018-03-05 RX ORDER — ASPIRIN 81 MG
TABLET, DELAYED RELEASE (ENTERIC COATED) ORAL
Qty: 100 TABLET | Refills: 0 | Status: SHIPPED | OUTPATIENT
Start: 2018-03-05 | End: 2018-07-08

## 2018-03-05 NOTE — TELEPHONE ENCOUNTER
Hypertensive Medications  Protocol Criteria:  · Appointment scheduled in the past 6 months or in the next 3 months  · BMP or CMP in the past 12 months  · Creatinine result < 2  Recent Outpatient Visits            1 month ago Essential hypertension    [place] Cardiomyopathy, unspecified type [facility])    [facility] Clarance Hatchet, MD    Office Visit          Refilled per protocol

## 2018-04-06 NOTE — TELEPHONE ENCOUNTER
Cholesterol Medications  Protocol Criteria:  · Appointment scheduled in the past 12 months or in the next 3 months  · ALT & LDL on file in the past 12 months  · ALT result < 80  · LDL result <130   Recent Outpatient Visits            2 months ago Essential

## 2018-04-06 NOTE — TELEPHONE ENCOUNTER
Pts wife called for refill:     Current Outpatient Prescriptions:   •  atorvastatin 80 MG Oral Tab, Take 1 tablet (80 mg total) by mouth once daily. , Disp: 90 tablet, Rfl: 0    Please call.

## 2018-04-09 NOTE — TELEPHONE ENCOUNTER
Pt wife called to check status on the following medication informed status is pending please call thank you       Current Outpatient Prescriptions:     •  atorvastatin 80 MG Oral Tab, Take 1 tablet (80 mg total) by mouth once daily. , Disp: 90 tablet, Rfl:

## 2018-04-10 RX ORDER — ATORVASTATIN CALCIUM 80 MG/1
80 TABLET, FILM COATED ORAL
Qty: 90 TABLET | Refills: 0 | Status: SHIPPED | OUTPATIENT
Start: 2018-04-10 | End: 2018-07-06

## 2018-05-07 NOTE — TELEPHONE ENCOUNTER
please advise as does not meet RN protocol for refill criteria      Hypertensive Medications  Protocol Criteria:  · Appointment scheduled in the past 6 months or in the next 3 months  · BMP or CMP in the past 12 months  · Creatinine result < 2  Recent Out

## 2018-05-08 RX ORDER — LOSARTAN POTASSIUM AND HYDROCHLOROTHIAZIDE 12.5; 5 MG/1; MG/1
TABLET ORAL
Qty: 90 TABLET | Refills: 2 | Status: SHIPPED | OUTPATIENT
Start: 2018-05-08 | End: 2018-12-24

## 2018-06-08 ENCOUNTER — LAB ENCOUNTER (OUTPATIENT)
Dept: LAB | Age: 68
End: 2018-06-08
Attending: FAMILY MEDICINE
Payer: MEDICARE

## 2018-06-08 ENCOUNTER — HOSPITAL ENCOUNTER (OUTPATIENT)
Dept: GENERAL RADIOLOGY | Age: 68
Discharge: HOME | End: 2018-06-08
Attending: FAMILY MEDICINE
Payer: MEDICARE

## 2018-06-08 ENCOUNTER — HOSPITAL ENCOUNTER (OUTPATIENT)
Dept: GENERAL RADIOLOGY | Age: 68
Discharge: HOME | End: 2018-06-08
Attending: FAMILY MEDICINE | Admitting: FAMILY MEDICINE
Payer: MEDICARE

## 2018-06-08 ENCOUNTER — OFFICE VISIT (OUTPATIENT)
Dept: FAMILY MEDICINE CLINIC | Facility: CLINIC | Age: 68
End: 2018-06-08

## 2018-06-08 VITALS
SYSTOLIC BLOOD PRESSURE: 122 MMHG | WEIGHT: 241 LBS | TEMPERATURE: 98 F | RESPIRATION RATE: 17 BRPM | DIASTOLIC BLOOD PRESSURE: 78 MMHG | BODY MASS INDEX: 36.53 KG/M2 | HEART RATE: 73 BPM | HEIGHT: 68 IN | OXYGEN SATURATION: 97 %

## 2018-06-08 DIAGNOSIS — I10 ESSENTIAL HYPERTENSION: ICD-10-CM

## 2018-06-08 DIAGNOSIS — M79.645 FINGER PAIN, LEFT: ICD-10-CM

## 2018-06-08 DIAGNOSIS — M79.89 LEG SWELLING: ICD-10-CM

## 2018-06-08 DIAGNOSIS — M79.645 FINGER PAIN, LEFT: Primary | ICD-10-CM

## 2018-06-08 PROCEDURE — 73140 X-RAY EXAM OF FINGER(S): CPT | Performed by: FAMILY MEDICINE

## 2018-06-08 PROCEDURE — 86431 RHEUMATOID FACTOR QUANT: CPT

## 2018-06-08 PROCEDURE — 86038 ANTINUCLEAR ANTIBODIES: CPT

## 2018-06-08 PROCEDURE — 84550 ASSAY OF BLOOD/URIC ACID: CPT

## 2018-06-08 PROCEDURE — 36415 COLL VENOUS BLD VENIPUNCTURE: CPT

## 2018-06-08 PROCEDURE — 80053 COMPREHEN METABOLIC PANEL: CPT

## 2018-06-08 PROCEDURE — G0463 HOSPITAL OUTPT CLINIC VISIT: HCPCS | Performed by: FAMILY MEDICINE

## 2018-06-08 PROCEDURE — 85025 COMPLETE CBC W/AUTO DIFF WBC: CPT

## 2018-06-08 PROCEDURE — 85652 RBC SED RATE AUTOMATED: CPT

## 2018-06-08 PROCEDURE — 99214 OFFICE O/P EST MOD 30 MIN: CPT | Performed by: FAMILY MEDICINE

## 2018-06-08 RX ORDER — DICLOFENAC SODIUM 75 MG/1
75 TABLET, DELAYED RELEASE ORAL 2 TIMES DAILY
Qty: 60 TABLET | Refills: 1 | Status: SHIPPED | OUTPATIENT
Start: 2018-06-08 | End: 2018-11-15

## 2018-06-08 NOTE — PATIENT INSTRUCTIONS
Diclofenac 75 mg orally twice daily. Rheumatological work up initiated. Medication reviewed and renewed where needed and appropriate. Monitor blood pressures and record at home. Limit salt intake.   Recommend weight loss via daily exercising and consiste

## 2018-06-09 RX ORDER — DICLOFENAC SODIUM 75 MG/1
TABLET, DELAYED RELEASE ORAL
Qty: 180 TABLET | Refills: 1 | OUTPATIENT
Start: 2018-06-09

## 2018-06-15 NOTE — PROGRESS NOTES
HPI:    Patient ID: Fransisca Huston is a 79year old male. Cough   This is a recurrent problem. The current episode started 1 to 4 weeks ago. The problem has been waxing and waning. The cough is non-productive.  Associated symptoms include nasal congest atorvastatin 80 MG Oral Tab Take 1 tablet (80 mg total) by mouth once daily.  Disp: 90 tablet Rfl: 0   ASPIRIN EC LOW DOSE 81 MG Oral Tab EC TAKE 1 TABLET BY MOUTH DAILY Disp: 100 tablet Rfl: 0   Metoprolol Succinate ER 25 MG Oral Tablet 24 Hr Take 1 tabl Tab EC; Take 1 tablet (75 mg total) by mouth 2 (two) times daily. Dispense: 60 tablet; Refill: 1    2. Leg swelling  See patient instructions    3. Essential hypertension  Status is to goal; Monitor blood pressures and record at home. Limit salt intake.

## 2018-06-28 ENCOUNTER — TELEPHONE (OUTPATIENT)
Dept: OTHER | Age: 68
End: 2018-06-28

## 2018-06-28 NOTE — TELEPHONE ENCOUNTER
Advised patient on Dr. Bharathi Witt information. Patient verbalized understanding, had no questions. Advised to call back if needed.       Notes recorded by Sarah Sandoval LPN on 9/69/0512 at 3:82 AM CDT  Left pt a detailed message to call office for lab re

## 2018-07-06 RX ORDER — ATORVASTATIN CALCIUM 80 MG/1
TABLET, FILM COATED ORAL
Qty: 90 TABLET | Refills: 1 | Status: SHIPPED | OUTPATIENT
Start: 2018-07-06 | End: 2018-12-24

## 2018-07-09 RX ORDER — ASPIRIN 81 MG/1
TABLET, COATED ORAL
Qty: 100 TABLET | Refills: 0 | Status: SHIPPED | OUTPATIENT
Start: 2018-07-09 | End: 2018-09-25

## 2018-07-10 NOTE — TELEPHONE ENCOUNTER
Refill Protocol Appointment Criteria  · Appointment scheduled in the past 6 months or in the next 3 months  Recent Outpatient Visits            1 month ago Finger pain, left    [address], [place]    Office Visit

## 2018-09-25 RX ORDER — ASPIRIN 81 MG/1
TABLET, COATED ORAL
Qty: 100 TABLET | Refills: 0 | Status: SHIPPED | OUTPATIENT
Start: 2018-09-25 | End: 2018-12-24

## 2018-09-26 ENCOUNTER — TELEPHONE (OUTPATIENT)
Dept: CARDIOLOGY CLINIC | Facility: CLINIC | Age: 68
End: 2018-09-26

## 2018-09-27 RX ORDER — METOPROLOL SUCCINATE 25 MG/1
25 TABLET, EXTENDED RELEASE ORAL 2 TIMES DAILY
Qty: 120 TABLET | Refills: 0 | Status: SHIPPED | OUTPATIENT
Start: 2018-09-27 | End: 2019-01-29

## 2018-09-27 NOTE — TELEPHONE ENCOUNTER
Metoprolol reviewed LOV, , and subsequent communications, dose is 25 mg bid, Has not followed up , 60 day [place]. and patient appt scheduled for nov 15.     Hypertensive Medications  Protocol Criteria:  · Appointment scheduled with Ca

## 2018-09-28 RX ORDER — METOPROLOL SUCCINATE 25 MG/1
TABLET, EXTENDED RELEASE ORAL
Qty: 180 TABLET | Refills: 0 | OUTPATIENT
Start: 2018-09-28

## 2018-09-28 NOTE — TELEPHONE ENCOUNTER
Medication was refilled yesterday and pt was notified the quantity would be to cover him until his follow up appt in Nov.  Rx declined.

## 2018-11-08 ENCOUNTER — NURSE TRIAGE (OUTPATIENT)
Dept: OTHER | Age: 68
End: 2018-11-08

## 2018-11-08 NOTE — TELEPHONE ENCOUNTER
Action Requested: Summary for Provider     []  Critical Lab, Recommendations Needed  [] Need Additional Advice  []   FYI    []   Need Orders  [] Need Medications Sent to Pharmacy  []  Other     SUMMARY: appt scheduled 11/12/18 to see PCP  Onset > 2weeks ag

## 2018-11-12 ENCOUNTER — OFFICE VISIT (OUTPATIENT)
Dept: FAMILY MEDICINE CLINIC | Facility: CLINIC | Age: 68
End: 2018-11-12
Payer: MEDICARE

## 2018-11-12 VITALS
BODY MASS INDEX: 35.31 KG/M2 | RESPIRATION RATE: 17 BRPM | DIASTOLIC BLOOD PRESSURE: 76 MMHG | HEART RATE: 75 BPM | WEIGHT: 233 LBS | HEIGHT: 68 IN | SYSTOLIC BLOOD PRESSURE: 126 MMHG

## 2018-11-12 DIAGNOSIS — I10 ESSENTIAL HYPERTENSION: Primary | ICD-10-CM

## 2018-11-12 DIAGNOSIS — S39.011A STRAIN OF ABDOMINAL MUSCLE, INITIAL ENCOUNTER: ICD-10-CM

## 2018-11-12 PROCEDURE — G0463 HOSPITAL OUTPT CLINIC VISIT: HCPCS | Performed by: FAMILY MEDICINE

## 2018-11-12 PROCEDURE — 99214 OFFICE O/P EST MOD 30 MIN: CPT | Performed by: FAMILY MEDICINE

## 2018-11-12 NOTE — PATIENT INSTRUCTIONS
Recommend weight loss via daily exercising and consistent healthy dietary changes. Medication reviewed and renewed where needed and appropriate. Comply with medications. Monitor blood pressures and record at home. Limit salt intake.   Encouraged physical

## 2018-11-12 NOTE — PROGRESS NOTES
HPI:    Patient ID: Mayra Kay is a 79year old male. 79year old AA male here after an acute strain of right pelvic rim and abdominal muscle after throwing his leg over a fence 3 week duration and now in spontaneous resolution.       Abdominal Tony Larson ATORVASTATIN 80 MG Oral Tab TAKE 1 TABLET(80 MG) BY MOUTH EVERY DAY Disp: 90 tablet Rfl: 1   LOSARTAN POTASSIUM-HCTZ 50-12.5 MG Oral Tab TAKE 1 TABLET BY MOUTH DAILY Disp: 90 tablet Rfl: 2   Diclofenac Sodium 75 MG Oral Tab EC Take 1 tablet (75 mg total) b Encouraged physical fitness and daily physical activity daily (PLAY). Recommend \"Two Old Goat\". Return in about 6 weeks (around 12/24/2018), or if symptoms worsen or fail to improve.          #[id number]

## 2018-11-15 ENCOUNTER — OFFICE VISIT (OUTPATIENT)
Dept: CARDIOLOGY CLINIC | Facility: CLINIC | Age: 68
End: 2018-11-15
Payer: MEDICARE

## 2018-11-15 VITALS
SYSTOLIC BLOOD PRESSURE: 123 MMHG | TEMPERATURE: 98 F | HEART RATE: 73 BPM | HEIGHT: 68 IN | WEIGHT: 233 LBS | DIASTOLIC BLOOD PRESSURE: 78 MMHG | BODY MASS INDEX: 35.31 KG/M2 | RESPIRATION RATE: 18 BRPM

## 2018-11-15 DIAGNOSIS — E78.2 MIXED HYPERLIPIDEMIA: ICD-10-CM

## 2018-11-15 DIAGNOSIS — I42.0 DILATED CARDIOMYOPATHY (HCC): ICD-10-CM

## 2018-11-15 DIAGNOSIS — I10 ESSENTIAL HYPERTENSION: ICD-10-CM

## 2018-11-15 DIAGNOSIS — I50.22 SYSTOLIC CHF, CHRONIC (HCC): Primary | ICD-10-CM

## 2018-11-15 PROCEDURE — G0463 HOSPITAL OUTPT CLINIC VISIT: HCPCS | Performed by: INTERNAL MEDICINE

## 2018-11-15 PROCEDURE — 99214 OFFICE O/P EST MOD 30 MIN: CPT | Performed by: INTERNAL MEDICINE

## 2018-11-15 RX ORDER — TRAMADOL HYDROCHLORIDE 50 MG/1
50 TABLET ORAL
COMMUNITY
Start: 2014-07-01 | End: 2018-11-15

## 2018-11-15 RX ORDER — PANTOPRAZOLE SODIUM 40 MG/1
40 TABLET, DELAYED RELEASE ORAL
COMMUNITY
End: 2018-11-15

## 2018-11-15 NOTE — PROGRESS NOTES
[facility]    Cardiac Electrophysiology Progress Note  11/15/2018      HPI:   Natacha Armenta is a 79year old who is doing great since our visit last summer. He stayed active, spending some of the colder months in [facility], on a farm, fishing.   He i turgor. Neurologic: Alert and oriented x 3. No dysarthria, facial droop, or gross motor deficits.     LABS/DATA:     Lab Results   Component Value Date    WBC 9.0 06/08/2018    RBC 5.47 06/08/2018    HGB 15.4 06/08/2018    HCT 46.5 06/08/2018    MCV 84.9

## 2018-12-24 RX ORDER — LOSARTAN POTASSIUM AND HYDROCHLOROTHIAZIDE 12.5; 5 MG/1; MG/1
TABLET ORAL
Qty: 90 TABLET | Refills: 0 | Status: SHIPPED | OUTPATIENT
Start: 2018-12-24 | End: 2019-01-28 | Stop reason: SINTOL

## 2018-12-24 RX ORDER — ASPIRIN 81 MG/1
TABLET, COATED ORAL
Qty: 100 TABLET | Refills: 0 | Status: SHIPPED | OUTPATIENT
Start: 2018-12-24 | End: 2019-01-29

## 2018-12-24 RX ORDER — ATORVASTATIN CALCIUM 80 MG/1
TABLET, FILM COATED ORAL
Qty: 30 TABLET | Refills: 0 | Status: SHIPPED | OUTPATIENT
Start: 2018-12-24 | End: 2019-01-23

## 2018-12-24 NOTE — TELEPHONE ENCOUNTER
Verified medication dose, , refill order sent for 30 days. S/W pt to get lab work done, states understanding, and refill for 30 days.   Cholesterol Medications  Protocol Criteria:  · Appointment scheduled with Cardiology in the past 12 months or in the next

## 2018-12-28 ENCOUNTER — APPOINTMENT (OUTPATIENT)
Dept: LAB | Age: 68
End: 2018-12-28
Attending: INTERNAL MEDICINE
Payer: MEDICARE

## 2018-12-28 DIAGNOSIS — E78.2 MIXED HYPERLIPIDEMIA: ICD-10-CM

## 2018-12-28 DIAGNOSIS — I50.22 SYSTOLIC CHF, CHRONIC (HCC): ICD-10-CM

## 2018-12-28 LAB
ALBUMIN SERPL BCP-MCNC: 3.8 G/DL (ref 3.5–4.8)
ALBUMIN/GLOB SERPL: 1.1 {RATIO} (ref 1–2)
ALP SERPL-CCNC: 113 U/L (ref 32–100)
ALT SERPL-CCNC: 20 U/L (ref 17–63)
ANION GAP SERPL CALC-SCNC: 10 MMOL/L (ref 0–18)
AST SERPL-CCNC: 23 U/L (ref 15–41)
BILIRUB SERPL-MCNC: 0.9 MG/DL (ref 0.3–1.2)
BUN SERPL-MCNC: 17 MG/DL (ref 8–20)
BUN/CREAT SERPL: 11 (ref 10–20)
CALCIUM SERPL-MCNC: 9.3 MG/DL (ref 8.5–10.5)
CHLORIDE SERPL-SCNC: 103 MMOL/L (ref 95–110)
CHOLEST SERPL-MCNC: 126 MG/DL (ref 110–200)
CO2 SERPL-SCNC: 28 MMOL/L (ref 22–32)
CREAT SERPL-MCNC: 1.54 MG/DL (ref 0.5–1.5)
GLOBULIN PLAS-MCNC: 3.6 G/DL (ref 2.5–3.7)
GLUCOSE SERPL-MCNC: 115 MG/DL (ref 70–99)
HDLC SERPL-MCNC: 38 MG/DL
LDLC SERPL CALC-MCNC: 72 MG/DL (ref 0–99)
NONHDLC SERPL-MCNC: 88 MG/DL
OSMOLALITY UR CALC.SUM OF ELEC: 294 MOSM/KG (ref 275–295)
PATIENT FASTING: YES
POTASSIUM SERPL-SCNC: 4.1 MMOL/L (ref 3.3–5.1)
PROT SERPL-MCNC: 7.4 G/DL (ref 5.9–8.4)
SODIUM SERPL-SCNC: 141 MMOL/L (ref 136–144)
TRIGL SERPL-MCNC: 80 MG/DL (ref 1–149)

## 2018-12-28 PROCEDURE — 80061 LIPID PANEL: CPT

## 2018-12-28 PROCEDURE — 80053 COMPREHEN METABOLIC PANEL: CPT

## 2018-12-28 PROCEDURE — 36415 COLL VENOUS BLD VENIPUNCTURE: CPT

## 2019-01-03 ENCOUNTER — TELEPHONE (OUTPATIENT)
Dept: CARDIOLOGY CLINIC | Facility: CLINIC | Age: 69
End: 2019-01-03

## 2019-01-03 DIAGNOSIS — I42.0 CARDIOMYOPATHY, DILATED (HCC): ICD-10-CM

## 2019-01-03 DIAGNOSIS — E78.2 MIXED HYPERLIPIDEMIA: ICD-10-CM

## 2019-01-03 DIAGNOSIS — I10 HYPERTENSION, ESSENTIAL: ICD-10-CM

## 2019-01-03 DIAGNOSIS — I50.22 CHRONIC SYSTOLIC HEART FAILURE (HCC): Primary | ICD-10-CM

## 2019-01-03 NOTE — TELEPHONE ENCOUNTER
Per [facility]: Lipids are at goal,  Renal function is borderline high recheck BMP in 2-3 wks   S/w Nilay Morales advised of lab results and [facility]'s recommendation to have labs redrawn in 2-3 weeks. Agreeable with plan. Orders entered.

## 2019-01-23 ENCOUNTER — APPOINTMENT (OUTPATIENT)
Dept: LAB | Age: 69
End: 2019-01-23
Attending: NURSE PRACTITIONER
Payer: MEDICARE

## 2019-01-23 DIAGNOSIS — I50.22 CHRONIC SYSTOLIC HEART FAILURE (HCC): ICD-10-CM

## 2019-01-23 DIAGNOSIS — E78.2 MIXED HYPERLIPIDEMIA: ICD-10-CM

## 2019-01-23 DIAGNOSIS — I42.0 CARDIOMYOPATHY, DILATED (HCC): ICD-10-CM

## 2019-01-23 DIAGNOSIS — I10 HYPERTENSION, ESSENTIAL: ICD-10-CM

## 2019-01-23 LAB
ANION GAP SERPL CALC-SCNC: 14 MMOL/L (ref 0–18)
BUN SERPL-MCNC: 14 MG/DL (ref 8–20)
BUN/CREAT SERPL: 8.6 (ref 10–20)
CALCIUM SERPL-MCNC: 9.3 MG/DL (ref 8.5–10.5)
CHLORIDE SERPL-SCNC: 102 MMOL/L (ref 95–110)
CO2 SERPL-SCNC: 23 MMOL/L (ref 22–32)
CREAT SERPL-MCNC: 1.62 MG/DL (ref 0.5–1.5)
GLUCOSE SERPL-MCNC: 132 MG/DL (ref 70–99)
OSMOLALITY UR CALC.SUM OF ELEC: 290 MOSM/KG (ref 275–295)
POTASSIUM SERPL-SCNC: 3.4 MMOL/L (ref 3.3–5.1)
SODIUM SERPL-SCNC: 139 MMOL/L (ref 136–144)

## 2019-01-23 PROCEDURE — 80048 BASIC METABOLIC PNL TOTAL CA: CPT

## 2019-01-23 PROCEDURE — 36415 COLL VENOUS BLD VENIPUNCTURE: CPT

## 2019-01-23 RX ORDER — ATORVASTATIN CALCIUM 80 MG/1
TABLET, FILM COATED ORAL
Qty: 90 TABLET | Refills: 1 | Status: SHIPPED | OUTPATIENT
Start: 2019-01-23 | End: 2019-01-29

## 2019-01-28 ENCOUNTER — TELEPHONE (OUTPATIENT)
Dept: CARDIOLOGY CLINIC | Facility: CLINIC | Age: 69
End: 2019-01-28

## 2019-01-28 DIAGNOSIS — I10 HYPERTENSION, UNSPECIFIED TYPE: ICD-10-CM

## 2019-01-28 DIAGNOSIS — I42.0 CONGESTIVE CARDIOMYOPATHY (HCC): Primary | ICD-10-CM

## 2019-01-28 RX ORDER — LOSARTAN POTASSIUM 50 MG/1
50 TABLET ORAL DAILY
Qty: 90 TABLET | Refills: 1 | Status: SHIPPED | OUTPATIENT
Start: 2019-01-28 | End: 2019-07-20

## 2019-01-28 NOTE — TELEPHONE ENCOUNTER
Renal function higher than before try changing losartan/HCTZ to just losartan 50mg and recheck BMP in 2 wks, check BPs as well   S/w wife and Chilo Jeff to relay above info. Instructed to  new RX, they changed to [facility] pharmacy.   Have lab work done in 2 w

## 2019-01-29 ENCOUNTER — HOSPITAL ENCOUNTER (OUTPATIENT)
Dept: GENERAL RADIOLOGY | Age: 69
Discharge: HOME | End: 2019-01-29
Attending: FAMILY MEDICINE | Admitting: FAMILY MEDICINE
Payer: MEDICARE

## 2019-01-29 ENCOUNTER — OFFICE VISIT (OUTPATIENT)
Dept: FAMILY MEDICINE CLINIC | Facility: CLINIC | Age: 69
End: 2019-01-29
Payer: MEDICARE

## 2019-01-29 ENCOUNTER — HOSPITAL ENCOUNTER (OUTPATIENT)
Dept: GENERAL RADIOLOGY | Age: 69
Discharge: HOME | End: 2019-01-29
Attending: FAMILY MEDICINE
Payer: MEDICARE

## 2019-01-29 VITALS
DIASTOLIC BLOOD PRESSURE: 75 MMHG | BODY MASS INDEX: 35.61 KG/M2 | RESPIRATION RATE: 17 BRPM | HEART RATE: 91 BPM | WEIGHT: 235 LBS | SYSTOLIC BLOOD PRESSURE: 110 MMHG | HEIGHT: 68 IN

## 2019-01-29 DIAGNOSIS — I10 ESSENTIAL HYPERTENSION: ICD-10-CM

## 2019-01-29 DIAGNOSIS — G89.29 RIGHT FLANK PAIN, CHRONIC: ICD-10-CM

## 2019-01-29 DIAGNOSIS — R10.9 RIGHT FLANK PAIN, CHRONIC: ICD-10-CM

## 2019-01-29 DIAGNOSIS — N28.9 RENAL INSUFFICIENCY: ICD-10-CM

## 2019-01-29 DIAGNOSIS — E78.5 HYPERLIPIDEMIA, UNSPECIFIED HYPERLIPIDEMIA TYPE: Primary | ICD-10-CM

## 2019-01-29 PROCEDURE — 99214 OFFICE O/P EST MOD 30 MIN: CPT | Performed by: FAMILY MEDICINE

## 2019-01-29 PROCEDURE — 72070 X-RAY EXAM THORAC SPINE 2VWS: CPT | Performed by: FAMILY MEDICINE

## 2019-01-29 PROCEDURE — 72110 X-RAY EXAM L-2 SPINE 4/>VWS: CPT | Performed by: FAMILY MEDICINE

## 2019-01-29 PROCEDURE — 71100 X-RAY EXAM RIBS UNI 2 VIEWS: CPT | Performed by: FAMILY MEDICINE

## 2019-01-29 PROCEDURE — G0463 HOSPITAL OUTPT CLINIC VISIT: HCPCS | Performed by: FAMILY MEDICINE

## 2019-01-29 RX ORDER — ATORVASTATIN CALCIUM 80 MG/1
TABLET, FILM COATED ORAL
Qty: 90 TABLET | Refills: 1 | Status: SHIPPED | OUTPATIENT
Start: 2019-01-29 | End: 2019-08-02

## 2019-01-29 RX ORDER — METOPROLOL SUCCINATE 25 MG/1
25 TABLET, EXTENDED RELEASE ORAL 2 TIMES DAILY
Qty: 180 TABLET | Refills: 1 | Status: SHIPPED | OUTPATIENT
Start: 2019-01-29 | End: 2019-07-24

## 2019-01-29 RX ORDER — ASPIRIN 81 MG/1
81 TABLET ORAL
Qty: 100 TABLET | Refills: 0 | Status: SHIPPED | OUTPATIENT
Start: 2019-01-29 | End: 2019-06-24

## 2019-01-29 NOTE — TELEPHONE ENCOUNTER
Nata please advise on refill. Patient meets refill protocol because creatinine has increased less than 30% since December however seems CR has been increasing slowly over the past year. OK to refill Metoprolol?       Results for Megha Michael (MRN [id number] 12/28/2018    BILT 0.9 12/28/2018    TP 7.4 12/28/2018    ALB 3.8 12/28/2018     01/23/2019    K 3.4 01/23/2019     01/23/2019    CO2 23 01/23/2019    GLOBULIN 3.6 12/28/2018    ANIONGAP 14 01/23/2019    OSMOCALC 290 01/23/2019

## 2019-01-29 NOTE — PATIENT INSTRUCTIONS
OMT done. Medication reviewed and renewed where needed and appropriate. Comply with medications. Monitor blood pressures and record at home. Limit salt intake. Recommend weight loss via daily exercising and consistent healthy dietary changes.   X-ray of

## 2019-02-04 ENCOUNTER — TELEPHONE (OUTPATIENT)
Dept: FAMILY MEDICINE CLINIC | Facility: CLINIC | Age: 69
End: 2019-02-04

## 2019-02-04 ENCOUNTER — TELEPHONE (OUTPATIENT)
Dept: CARDIOLOGY CLINIC | Facility: CLINIC | Age: 69
End: 2019-02-04

## 2019-02-04 DIAGNOSIS — E78.5 HYPERLIPIDEMIA, UNSPECIFIED HYPERLIPIDEMIA TYPE: ICD-10-CM

## 2019-02-04 RX ORDER — ATORVASTATIN CALCIUM 80 MG/1
TABLET, FILM COATED ORAL
Qty: 90 TABLET | Refills: 1 | Status: CANCELLED | OUTPATIENT
Start: 2019-02-04

## 2019-02-04 RX ORDER — ASPIRIN 81 MG/1
81 TABLET ORAL
Qty: 100 TABLET | Refills: 0 | Status: CANCELLED | OUTPATIENT
Start: 2019-02-04

## 2019-02-04 NOTE — TELEPHONE ENCOUNTER
S/w Wife Wendy Street to increase metoprolol to 50 mg daily. And then have paolo relax for 5 mins before checking his bp, and check bp for several days and then call if not better. Wife states understanding.

## 2019-02-04 NOTE — TELEPHONE ENCOUNTER
Pt's spouse tried reaching the pharmacy regarding a refill for medications listed below, however she was told to contact dr's office. Refills needed for medications listed. Please advise thank you.        atorvastatin 80 MG Oral Tab TAKE 1 TABLET(80 MG)

## 2019-02-04 NOTE — TELEPHONE ENCOUNTER
Pts wife states that ever since pt started taking Losartan his BP has been running higher (140's/80's). Please call.

## 2019-02-04 NOTE — TELEPHONE ENCOUNTER
Try increasing metoprolol to 50mg daily to help with BP, would like to avoid restarting HCTZ with worsing renal function

## 2019-02-05 ENCOUNTER — APPOINTMENT (OUTPATIENT)
Dept: LAB | Age: 69
End: 2019-02-05
Attending: NURSE PRACTITIONER
Payer: MEDICARE

## 2019-02-05 DIAGNOSIS — I42.0 CONGESTIVE CARDIOMYOPATHY (HCC): ICD-10-CM

## 2019-02-05 DIAGNOSIS — I10 HYPERTENSION, UNSPECIFIED TYPE: ICD-10-CM

## 2019-02-05 LAB
ANION GAP SERPL CALC-SCNC: 9 MMOL/L (ref 0–18)
BUN SERPL-MCNC: 12 MG/DL (ref 8–20)
BUN/CREAT SERPL: 8.3 (ref 10–20)
CALCIUM SERPL-MCNC: 9.2 MG/DL (ref 8.5–10.5)
CHLORIDE SERPL-SCNC: 105 MMOL/L (ref 95–110)
CO2 SERPL-SCNC: 25 MMOL/L (ref 22–32)
CREAT SERPL-MCNC: 1.44 MG/DL (ref 0.5–1.5)
GLUCOSE SERPL-MCNC: 114 MG/DL (ref 70–99)
OSMOLALITY UR CALC.SUM OF ELEC: 289 MOSM/KG (ref 275–295)
POTASSIUM SERPL-SCNC: 4.3 MMOL/L (ref 3.3–5.1)
SODIUM SERPL-SCNC: 139 MMOL/L (ref 136–144)

## 2019-02-05 PROCEDURE — 36415 COLL VENOUS BLD VENIPUNCTURE: CPT

## 2019-02-05 PROCEDURE — 80048 BASIC METABOLIC PNL TOTAL CA: CPT

## 2019-02-06 NOTE — TELEPHONE ENCOUNTER
Per wife of pt, she went to the pharmacy and told her that they did not received the Atorvastatin and Aspirin of pt. Pls advise.

## 2019-02-06 NOTE — TELEPHONE ENCOUNTER
[facility] pharmacy stated that they have both prescriptions. Filling the Aspirin now but atorvastatin is too early, will not be able to fill till 2/9/19. Patient advised.

## 2019-02-06 NOTE — PROGRESS NOTES
HPI:    Patient ID: Kathi Crigler is a 81year old male. Abdominal Pain   This is a chronic problem. The current episode started more than 1 month ago. The problem occurs intermittently. The problem has been waxing and waning.  The pain is located in 1. Right flank pain, chronic  The following films have been ordered for the patient to further assess cause for right flank/abdominal pain. - XR LUMBAR SPINE (MIN 4 VIEWS) (CPT=72110); Future  - XR RIBS, UNILATERAL (2 VIEWS), RIGHT (CPT=71100);  Future  -

## 2019-02-07 ENCOUNTER — OFFICE VISIT (OUTPATIENT)
Dept: FAMILY MEDICINE CLINIC | Facility: CLINIC | Age: 69
End: 2019-02-07
Payer: MEDICARE

## 2019-02-07 ENCOUNTER — TELEPHONE (OUTPATIENT)
Dept: CARDIOLOGY CLINIC | Facility: CLINIC | Age: 69
End: 2019-02-07

## 2019-02-07 VITALS
SYSTOLIC BLOOD PRESSURE: 120 MMHG | WEIGHT: 239 LBS | HEIGHT: 68 IN | DIASTOLIC BLOOD PRESSURE: 78 MMHG | BODY MASS INDEX: 36.22 KG/M2 | RESPIRATION RATE: 17 BRPM

## 2019-02-07 DIAGNOSIS — I10 ESSENTIAL HYPERTENSION: ICD-10-CM

## 2019-02-07 DIAGNOSIS — R10.11 ABDOMINAL PAIN, RIGHT UPPER QUADRANT: Primary | ICD-10-CM

## 2019-02-07 PROCEDURE — 99214 OFFICE O/P EST MOD 30 MIN: CPT | Performed by: FAMILY MEDICINE

## 2019-02-07 PROCEDURE — G0463 HOSPITAL OUTPT CLINIC VISIT: HCPCS | Performed by: FAMILY MEDICINE

## 2019-02-07 NOTE — PATIENT INSTRUCTIONS
Medication reviewed and renewed where needed and appropriate. Monitor blood pressures and record at home. Limit salt intake. Comply with medications. Recommend weight loss via daily exercising and consistent healthy dietary changes.   She has been instru

## 2019-02-07 NOTE — TELEPHONE ENCOUNTER
Checked chart. No information about being able to leave a detailed message. Left message asking patient to call to discuss labs and if RN unable to speak with patient when he calls to let us know what number detailed message may be left on.   Notes recorded

## 2019-02-07 NOTE — PROGRESS NOTES
HPI:    Patient ID: Viviana Scanlon is a 81year old male. Patient is 61-year-old -American male here to follow-up regarding the right flank/upper quadrant discomfort which has resolved by means were not sure about.   His films which included his place, and time and obese. No distress. HENT:   Right Ear: Tympanic membrane and ear canal normal.   Left Ear: Tympanic membrane and ear canal normal.   Nose: Nose normal.   Mouth/Throat: Oropharynx is clear and moist.   Neck: No thyromegaly present.    C

## 2019-03-25 RX ORDER — ASPIRIN 81 MG/1
TABLET, COATED ORAL
Qty: 100 TABLET | Refills: 0 | Status: SHIPPED | OUTPATIENT
Start: 2019-03-25 | End: 2019-09-30

## 2019-04-23 DIAGNOSIS — E78.5 HYPERLIPIDEMIA, UNSPECIFIED HYPERLIPIDEMIA TYPE: ICD-10-CM

## 2019-04-23 RX ORDER — ASPIRIN 81 MG/1
TABLET ORAL
Qty: 100 TABLET | Refills: 0 | OUTPATIENT
Start: 2019-04-23

## 2019-05-05 DIAGNOSIS — E78.5 HYPERLIPIDEMIA, UNSPECIFIED HYPERLIPIDEMIA TYPE: ICD-10-CM

## 2019-05-06 RX ORDER — ASPIRIN 81 MG/1
TABLET ORAL
Qty: 100 TABLET | Refills: 0 | OUTPATIENT
Start: 2019-05-06

## 2019-06-24 DIAGNOSIS — E78.5 HYPERLIPIDEMIA, UNSPECIFIED HYPERLIPIDEMIA TYPE: ICD-10-CM

## 2019-06-25 RX ORDER — ASPIRIN 81 MG/1
TABLET ORAL
Qty: 90 TABLET | Refills: 1 | Status: SHIPPED | OUTPATIENT
Start: 2019-06-25 | End: 2019-12-15

## 2019-06-25 NOTE — TELEPHONE ENCOUNTER
Refill passed per [facility] protocol.   Refill Protocol Appointment Criteria  · Appointment scheduled in the past 6 months or in the next 3 months  Recent Outpatient Visits            4 months ago Abdominal pain, right upper quadrant    [facility]

## 2019-07-22 RX ORDER — LOSARTAN POTASSIUM 50 MG/1
TABLET ORAL
Qty: 90 TABLET | Refills: 1 | Status: SHIPPED | OUTPATIENT
Start: 2019-07-22 | End: 2019-11-21

## 2019-07-22 NOTE — TELEPHONE ENCOUNTER
Losartan 50 mg daily LOV reviewed. No change in this medication. Meets refill protocol criteria. Refill sent.   Hypertensive Medications  Protocol Criteria:  · Appointment scheduled with Cardiology in the past 12 months or in the next 3 months  · BMP or CMP

## 2019-07-29 RX ORDER — METOPROLOL SUCCINATE 25 MG/1
TABLET, EXTENDED RELEASE ORAL
Qty: 180 TABLET | Refills: 1 | Status: SHIPPED | OUTPATIENT
Start: 2019-07-29 | End: 2020-01-15

## 2019-08-02 DIAGNOSIS — E78.5 HYPERLIPIDEMIA, UNSPECIFIED HYPERLIPIDEMIA TYPE: ICD-10-CM

## 2019-08-02 RX ORDER — ATORVASTATIN CALCIUM 80 MG/1
TABLET, FILM COATED ORAL
Qty: 90 TABLET | Refills: 1 | Status: SHIPPED | OUTPATIENT
Start: 2019-08-02 | End: 2020-01-14

## 2019-09-11 ENCOUNTER — TELEPHONE (OUTPATIENT)
Dept: OTHER | Age: 69
End: 2019-09-11

## 2019-09-11 NOTE — TELEPHONE ENCOUNTER
Pt spouse calling and states someone from Dr Kunal Sprague office called but may have dialed wrong number because \"it sounded like a different last name than Jose A\"    No documentation in chart that staff was trying to reach pt.   Pt spouse verb understand

## 2019-09-30 ENCOUNTER — APPOINTMENT (OUTPATIENT)
Dept: LAB | Age: 69
End: 2019-09-30
Attending: FAMILY MEDICINE
Payer: MEDICARE

## 2019-09-30 ENCOUNTER — OFFICE VISIT (OUTPATIENT)
Dept: FAMILY MEDICINE CLINIC | Facility: CLINIC | Age: 69
End: 2019-09-30
Payer: MEDICARE

## 2019-09-30 VITALS
BODY MASS INDEX: 36.68 KG/M2 | TEMPERATURE: 98 F | DIASTOLIC BLOOD PRESSURE: 83 MMHG | HEART RATE: 64 BPM | WEIGHT: 242 LBS | SYSTOLIC BLOOD PRESSURE: 140 MMHG | HEIGHT: 68 IN | RESPIRATION RATE: 20 BRPM

## 2019-09-30 DIAGNOSIS — R73.9 HYPERGLYCEMIA: ICD-10-CM

## 2019-09-30 DIAGNOSIS — R61 NIGHT SWEATS: ICD-10-CM

## 2019-09-30 DIAGNOSIS — E78.5 HYPERLIPIDEMIA, UNSPECIFIED HYPERLIPIDEMIA TYPE: ICD-10-CM

## 2019-09-30 DIAGNOSIS — I10 ESSENTIAL HYPERTENSION: ICD-10-CM

## 2019-09-30 DIAGNOSIS — R61 NIGHT SWEATS: Primary | ICD-10-CM

## 2019-09-30 PROCEDURE — 99214 OFFICE O/P EST MOD 30 MIN: CPT | Performed by: FAMILY MEDICINE

## 2019-09-30 PROCEDURE — 93000 ELECTROCARDIOGRAM COMPLETE: CPT | Performed by: FAMILY MEDICINE

## 2019-09-30 PROCEDURE — 84443 ASSAY THYROID STIM HORMONE: CPT

## 2019-09-30 PROCEDURE — 36415 COLL VENOUS BLD VENIPUNCTURE: CPT

## 2019-09-30 PROCEDURE — 86480 TB TEST CELL IMMUN MEASURE: CPT

## 2019-09-30 NOTE — PATIENT INSTRUCTIONS
Workup for night sweats initiated. EKG to be done. Medication reviewed and renewed where needed and appropriate. Comply with medications. Monitor blood pressures and record at home. Limit salt intake.   Recommend weight loss via daily exercising and con

## 2019-10-01 ENCOUNTER — TELEPHONE (OUTPATIENT)
Dept: OTHER | Age: 69
End: 2019-10-01

## 2019-10-01 DIAGNOSIS — R73.03 PRE-DIABETES: Primary | ICD-10-CM

## 2019-10-01 NOTE — TELEPHONE ENCOUNTER
Informed spouse test reordered. Verbalized will go back to have lab drawn. Different tube lab unable to be added.  A1C is lavender top

## 2019-10-01 NOTE — TELEPHONE ENCOUNTER
Patient's wife called stating patient got blood work done yesterday and was told the Hemoglobin A1C was not covered by Medicare. Wife states she contacted Medicare and was told that the diagnosis ordered with the test is not covered.      Dr. Bertha Luevano:

## 2019-10-02 ENCOUNTER — APPOINTMENT (OUTPATIENT)
Dept: LAB | Facility: REFERENCE LAB | Age: 69
End: 2019-10-02
Attending: FAMILY MEDICINE
Payer: MEDICARE

## 2019-10-02 DIAGNOSIS — R73.03 PRE-DIABETES: ICD-10-CM

## 2019-10-02 PROCEDURE — 36415 COLL VENOUS BLD VENIPUNCTURE: CPT

## 2019-10-02 PROCEDURE — 83036 HEMOGLOBIN GLYCOSYLATED A1C: CPT

## 2019-10-11 ENCOUNTER — TELEPHONE (OUTPATIENT)
Dept: FAMILY MEDICINE CLINIC | Facility: CLINIC | Age: 69
End: 2019-10-11

## 2019-10-11 NOTE — TELEPHONE ENCOUNTER
Pt's wife Arnoldo Graves (ROHIT) returning call regarding pt's test result. Informed spouse (Pt's Name and  verified) of pt's test results/recommendations from Dr Genaro Paul below. Spouse voiced understanding and will discuss recommendation with pt.      Notes nilsa

## 2019-10-11 NOTE — PROGRESS NOTES
HPI:    Patient ID: Kathi Crigler is a 81year old male.     Patient has 62-year-old -American male who is well-known to the clinic treated for hypertension and elevated lipids with borderline blood sugars is here today with concerns of increasing ASSESSMENT/PLAN:   1. Night sweats  TB screen and thyroid assessment warranted and ordered.   Also EKG to be done to make sure that this is not atypical angina.  - QUANTIFERON TB; Future  - TSH W REFLEX TO FREE T4; Future  - ELECTROCARDIOGRAM,

## 2019-11-21 ENCOUNTER — OFFICE VISIT (OUTPATIENT)
Dept: CARDIOLOGY CLINIC | Facility: CLINIC | Age: 69
End: 2019-11-21
Payer: MEDICARE

## 2019-11-21 VITALS
DIASTOLIC BLOOD PRESSURE: 72 MMHG | BODY MASS INDEX: 36.28 KG/M2 | WEIGHT: 239.38 LBS | HEIGHT: 68 IN | HEART RATE: 73 BPM | SYSTOLIC BLOOD PRESSURE: 162 MMHG | RESPIRATION RATE: 18 BRPM

## 2019-11-21 DIAGNOSIS — E78.2 MIXED HYPERLIPIDEMIA: ICD-10-CM

## 2019-11-21 DIAGNOSIS — I45.2 RBBB (RIGHT BUNDLE BRANCH BLOCK WITH LEFT ANTERIOR FASCICULAR BLOCK): ICD-10-CM

## 2019-11-21 DIAGNOSIS — I50.22 SYSTOLIC CHF, CHRONIC (HCC): Primary | ICD-10-CM

## 2019-11-21 DIAGNOSIS — I42.0 DILATED CARDIOMYOPATHY (HCC): ICD-10-CM

## 2019-11-21 DIAGNOSIS — I10 ESSENTIAL HYPERTENSION: ICD-10-CM

## 2019-11-21 PROCEDURE — 99214 OFFICE O/P EST MOD 30 MIN: CPT | Performed by: INTERNAL MEDICINE

## 2019-11-21 PROCEDURE — G0463 HOSPITAL OUTPT CLINIC VISIT: HCPCS | Performed by: INTERNAL MEDICINE

## 2019-11-21 RX ORDER — HYDROCHLOROTHIAZIDE 12.5 MG/1
12.5 CAPSULE, GELATIN COATED ORAL DAILY
Qty: 60 CAPSULE | Refills: 6 | Status: SHIPPED | OUTPATIENT
Start: 2019-11-21 | End: 2020-09-17

## 2019-11-21 RX ORDER — LOSARTAN POTASSIUM 50 MG/1
100 TABLET ORAL
Qty: 90 TABLET | Refills: 1 | COMMUNITY
Start: 2019-11-21 | End: 2019-12-24

## 2019-11-21 NOTE — PROGRESS NOTES
[facility], [facility]    Cardiac Electrophysiology Progress Note  11/21/2019      HPI:   Peg Walter is a 81year old who is doing well since our visit 1 year ago. He has no chest pain, exertional chest pain, dyspnea, orthopnea, PND, edema, palpitations. with left anterior fascicular block)  4. Essential hypertension  5.  Mixed hyperlipidemia    PLAN:   · Start HCTZ 12.5 mg daily  · Increase losartan to 100 mg daily  · Blood work in 2-3 weeks  · Follow-up with Nurse Eric Avila in 1 month to reassess BPs and adju

## 2019-11-21 NOTE — PATIENT INSTRUCTIONS
· Start HCTZ 12.5 mg daily  · Increase losartan to 100 mg daily  · Blood work in 2-3 weeks  · Follow-up with Nurse Kamila Shell in 1 month to reassess BPs and adjust medicines  · Continue with light to moderate aerobic exercise daily.   · Monitor fluid and salt in

## 2019-12-06 ENCOUNTER — APPOINTMENT (OUTPATIENT)
Dept: LAB | Age: 69
End: 2019-12-06
Attending: INTERNAL MEDICINE
Payer: MEDICARE

## 2019-12-06 DIAGNOSIS — E78.2 MIXED HYPERLIPIDEMIA: ICD-10-CM

## 2019-12-06 DIAGNOSIS — I10 ESSENTIAL HYPERTENSION: ICD-10-CM

## 2019-12-06 DIAGNOSIS — I50.22 SYSTOLIC CHF, CHRONIC (HCC): ICD-10-CM

## 2019-12-06 DIAGNOSIS — I45.2 RBBB (RIGHT BUNDLE BRANCH BLOCK WITH LEFT ANTERIOR FASCICULAR BLOCK): ICD-10-CM

## 2019-12-06 DIAGNOSIS — I42.0 DILATED CARDIOMYOPATHY (HCC): ICD-10-CM

## 2019-12-06 PROCEDURE — 85027 COMPLETE CBC AUTOMATED: CPT

## 2019-12-06 PROCEDURE — 80053 COMPREHEN METABOLIC PANEL: CPT

## 2019-12-06 PROCEDURE — 36415 COLL VENOUS BLD VENIPUNCTURE: CPT

## 2019-12-06 PROCEDURE — 80061 LIPID PANEL: CPT

## 2019-12-12 ENCOUNTER — OFFICE VISIT (OUTPATIENT)
Dept: CARDIOLOGY CLINIC | Facility: CLINIC | Age: 69
End: 2019-12-12
Payer: MEDICARE

## 2019-12-12 VITALS
BODY MASS INDEX: 36.07 KG/M2 | DIASTOLIC BLOOD PRESSURE: 82 MMHG | HEIGHT: 68 IN | RESPIRATION RATE: 18 BRPM | HEART RATE: 66 BPM | WEIGHT: 238 LBS | SYSTOLIC BLOOD PRESSURE: 138 MMHG

## 2019-12-12 DIAGNOSIS — I50.22 SYSTOLIC CHF, CHRONIC (HCC): Primary | ICD-10-CM

## 2019-12-12 DIAGNOSIS — I10 ESSENTIAL HYPERTENSION: ICD-10-CM

## 2019-12-12 DIAGNOSIS — E78.2 MIXED HYPERLIPIDEMIA: ICD-10-CM

## 2019-12-12 PROCEDURE — G0463 HOSPITAL OUTPT CLINIC VISIT: HCPCS | Performed by: INTERNAL MEDICINE

## 2019-12-12 PROCEDURE — 99214 OFFICE O/P EST MOD 30 MIN: CPT | Performed by: INTERNAL MEDICINE

## 2019-12-12 NOTE — PATIENT INSTRUCTIONS
-Continue current medications  -Perform light-moderate aerobic exercise, 30-45 minutes daily on most days of the week  -Limit simple carbohydrates/sugars and saturated fats, and limit overall calories to achieve and maintain ideal weight  -Follow-up with SONY

## 2019-12-12 NOTE — PROGRESS NOTES
[facility]    Cardiac Electrophysiology Progress Note  12/12/2019        HPI:   Moiz Perez is a 81year old who is following up after our visit last month. He increased his losartan and started hydrochlorothiazide, as I requested.   He has no ch Potassium 3.7   Chloride 106   Carbon Dioxide, Total 28.0   BUN 19 (H)   CREATININE 1.52 (H)   CALCIUM 8.9   BUN/CREAT Ratio 12.5   eGFR NON-AFR.  AMERICAN 47 (L)   eGFR  54 (L)   ANION GAP 5   CALCULATED OSMOLALITY 291   ALKALINE PHOSPHAT

## 2019-12-15 DIAGNOSIS — E78.5 HYPERLIPIDEMIA, UNSPECIFIED HYPERLIPIDEMIA TYPE: ICD-10-CM

## 2019-12-16 RX ORDER — ASPIRIN 81 MG/1
TABLET ORAL
Qty: 90 TABLET | Refills: 1 | Status: SHIPPED | OUTPATIENT
Start: 2019-12-16 | End: 2020-06-08

## 2019-12-16 NOTE — TELEPHONE ENCOUNTER
Refill Protocol Appointment Criteria  · Appointment scheduled in the past 6 months or in the next 3 months  Recent Outpatient Visits            4 days ago Systolic CHF, chronic [facility])    [facility] [place] [facility] Jenny Ayala MD    Office Visit

## 2019-12-19 NOTE — Clinical Note
2019       Jason Le MD  [address]  [facility] [address]  VIA In Basket      Patient: Huber Castro   YOB: 1954   Date of Visit: 2019       Dear Dr. Le:    Thank you for referring Tico Castro to me for evaluation. Below are my notes for this visit with him.    If you have questions, please do not hesitate to call me. I look forward to following your patient along with you.      Sincerely,        Huber Arriaza MD        CC: No Recipients  Huber Arriaza MD  2019 10:36 AM  Sign when Signing Visit  Patient: Huber Castro  : 1954  MRN: [id number]  Age: 65 year old      HPI:  Mr. Castro is seen today for follow-up of atherosclerotic vascular disease.  He has had treated adult onset diabetes and mild hypertension for a number of years.  He also has significant chronic liver disease the etiology of which is not completely clear to the patient or to me.  He underwent a TIPS procedure about 5 years ago to address portal hypertension.  He had a recent diagnosis of syncope at [facility] after being found more or less unconscious in his car at work in 2019.  He had a mildly elevated troponin but no definite acute coronary syndrome.  There  was initially some concern for acute stroke although an MRI did not substantiate that--he has had previous lacunes however.  CT angiography of his head and neck showed a 60 to 70% stenosis of the left vertebral artery and a 60-70% stenosis of the right internal carotid artery.  It seems that the final diagnosis was hepatic encephalopathy perhaps precipitated by a urinary tract infection.  He never had any chest pain or shortness of breath.  Troponin was mildly elevated but I gather without good clinical correlation for an acute coronary syndrome.  I have a report of an echocardiogram which indicates ejection fraction 50% but several regional wall motion abnormalities in the distribution of the LAD and  5/15/2017              [facility]         To whom it may concern,    Lisbet Corona is currently a patient under my medical care.   The patient's medical condition makes serving on jury duty inadvi right coronary artery.  Some consideration was given to stress testing but given his moderate thrombocytopenia and significant chronic liver disease, it was felt unlikely that he would not be a candidate for coronary revascularization.  He does not have any clinical history of ischemic heart disease events.  He he is exercising at home on a treadmill on most days of the week and not experiencing any cardiopulmonary symptoms.  He does office work. He quit smoking many years ago.    Review of Systems   Cardiovascular: Negative for leg swelling.   All other systems reviewed and are negative.      Current Outpatient Medications   Medication Sig Dispense Refill   • propranolol (INDERAL) 10 MG tablet TAKE 1 TABLET BY MOUTH TWICE DAILY 180 tablet 3   • zinc sulfate (ZINCATE) 220 (50 Zn) MG capsule TAKE 1 CAPSULE BY MOUTH EVERY DAY 30 capsule 5   • spironolactone (ALDACTONE) 25 MG tablet TAKE 1/2 TABLET BY MOUTH TWICE DAILY 30 tablet 6   • lactulose (CHRONULAC) 10 GM/15ML solution TAKE 30 ML BY MOUTH THREE TIMES DAILY. TITRATE FOR 2 TO 3 BOWEL MOVEMENTS/ DAY 2700 mL 0   • atorvastatin (LIPITOR) 40 MG tablet TAKE 1 TABLET BY MOUTH EVERY NIGHT 90 tablet 1   • furosemide (LASIX) 20 MG tablet TAKE 1 TABLET BY MOUTH DAILY AS NEEDED 90 tablet 0   • pantoprazole (PROTONIX) 40 MG tablet TAKE 1 TABLET BY MOUTH TWICE DAILY 30 MINUTES BEFORE BREAKFAST AND DINNER 180 tablet 6   • blood glucose (AZAM CONTOUR NEXT TEST) test strip      • Lancets Thin Misc      • cetirizine (ZYRTEC ALLERGY) 10 MG tablet      • rifAXIMin (XIFAXAN) 550 MG Tab TAKE 1 TABLET TWICE DAILY     • ALPRAZolam (XANAX PO)        No current facility-administered medications for this visit.        ALLERGIES:  Fish   (food or med); Shellfish allergy   (food or med); and Strawberry   (food or med)    Visit Vitals  /71 (BP Location: LUE - Left upper extremity, Patient Position: Sitting)   Pulse 65   Wt 85 kg (187 lb 6.3 oz)   SpO2 99%   BMI 26.89 kg/m²     Physical  Exam   Constitutional: He is oriented to person, place, and time. He appears well-developed and well-nourished.   HENT:   Head: Normocephalic and atraumatic.   Eyes: Pupils are equal, round, and reactive to light. Conjunctivae are normal.   Neck: Normal range of motion. Neck supple. No JVD present. No tracheal deviation present.   Cardiovascular: Normal rate, regular rhythm and intact distal pulses. Exam reveals no gallop and no friction rub.   No murmur heard.  Carotids are 1+ on the right with the bruit and 2+ on the left with no bruit.   Pulmonary/Chest: Effort normal and breath sounds normal. No stridor. No respiratory distress. He has no wheezes. He has no rales. He exhibits no tenderness.   Abdominal: Soft. He exhibits no distension. There is no tenderness. Musculoskeletal: Normal range of motion.         General: No edema.     Neurological: He is alert and oriented to person, place, and time.   Skin: Skin is warm and dry.   Psychiatric: He has a normal mood and affect. His behavior is normal. Judgment and thought content normal.   Vitals reviewed.      Data: EKG here in the office September 2019 showed normal sinus rhythm at 69 bpm with minor RV conduction delay and mild left axis deviation but was overall unremarkable.    Impression/Plan:  Hypertension, essential, benign  Blood pressure is under adequate control as blood pressure is usually a bit better than today's office reading    Stenosis of right carotid artery  He has a moderate-severe stenosis of the right internal carotid artery that is probably asymptomatic.  This should be reimaged in 2020.  He is on a statin.  He is not on aspirin because of moderate chronic thrombocytopenia related to his chronic liver disease and portal hypertension.    Mixed hyperlipidemia  On statin    Syncope, unspecified syncope type  He is exercising and feeling well at this point so he is not inclined to pursue any additional diagnostic testing--the only thing I was  considering was a follow-up echocardiogram given the abnormalities that were noted on the echocardiogram in September 2019 at [facility].  We agreed to defer any further testing at this point.          Huber Arriaza MD

## 2019-12-23 NOTE — TELEPHONE ENCOUNTER
Please advise on refill based on creatinine/GFR    Hypertensive Medications  Protocol Criteria:  · Appointment scheduled with Cardiology in the past 12 months or in the next 3 months  · BMP or CMP in the past 12 months  · Potassium: 3.1 - 4.9 mmol/L  · Cre

## 2019-12-24 ENCOUNTER — OFFICE VISIT (OUTPATIENT)
Dept: FAMILY MEDICINE CLINIC | Facility: CLINIC | Age: 69
End: 2019-12-24
Payer: MEDICARE

## 2019-12-24 VITALS
RESPIRATION RATE: 18 BRPM | HEIGHT: 68 IN | BODY MASS INDEX: 36.37 KG/M2 | HEART RATE: 80 BPM | SYSTOLIC BLOOD PRESSURE: 114 MMHG | WEIGHT: 240 LBS | TEMPERATURE: 98 F | DIASTOLIC BLOOD PRESSURE: 78 MMHG

## 2019-12-24 DIAGNOSIS — R42 DIZZINESS: ICD-10-CM

## 2019-12-24 DIAGNOSIS — I10 ESSENTIAL HYPERTENSION: ICD-10-CM

## 2019-12-24 DIAGNOSIS — J21.9 ACUTE BRONCHIOLITIS DUE TO UNSPECIFIED ORGANISM: Primary | ICD-10-CM

## 2019-12-24 PROCEDURE — 99214 OFFICE O/P EST MOD 30 MIN: CPT | Performed by: FAMILY MEDICINE

## 2019-12-24 RX ORDER — LOSARTAN POTASSIUM 50 MG/1
TABLET ORAL
Qty: 90 TABLET | Refills: 1 | Status: SHIPPED | OUTPATIENT
Start: 2019-12-24 | End: 2020-06-29

## 2019-12-24 RX ORDER — DEXTROMETHORPHAN HYDROBROMIDE AND PROMETHAZINE HYDROCHLORIDE 15; 6.25 MG/5ML; MG/5ML
5 SYRUP ORAL 4 TIMES DAILY PRN
Qty: 240 ML | Refills: 0 | Status: SHIPPED | OUTPATIENT
Start: 2019-12-24 | End: 2020-09-17

## 2019-12-24 RX ORDER — AZITHROMYCIN 250 MG/1
TABLET, FILM COATED ORAL
Qty: 6 TABLET | Refills: 0 | Status: SHIPPED | OUTPATIENT
Start: 2019-12-24 | End: 2020-09-17 | Stop reason: ALTCHOICE

## 2019-12-24 NOTE — PATIENT INSTRUCTIONS
Clear fluid hydration. Rest. Take all medications as prescribed. If symptoms persist or worsen please call or return to clinic ASAP. Medication reviewed and renewed where needed and appropriate. Comply with medications.   Monitor blood pressures and recor

## 2019-12-24 NOTE — PROGRESS NOTES
HPI:    Patient ID: Farida Salgado is a 81year old male. Patient is a 56-year-old -American male who is well-established in our clinic who presents today for follow-up regarding response to treatment for hypertension.   And also over the last 2 Exam    Constitutional: He is oriented to person, place, and time. He appears well-developed and well-nourished. He appears distressed.    HENT:   Right Ear: Tympanic membrane and ear canal normal.   Left Ear: Tympanic membrane and ear canal normal.   Nose: with medications. Monitor blood pressures and record at home. Limit salt intake. Recommend weight loss via daily exercising and consistent healthy dietary changes.   Patient has been instructed to return to the clinic on Friday, December 27, 2019 at noon

## 2019-12-27 ENCOUNTER — NURSE ONLY (OUTPATIENT)
Dept: FAMILY MEDICINE CLINIC | Facility: CLINIC | Age: 69
End: 2019-12-27
Payer: MEDICARE

## 2019-12-27 VITALS — SYSTOLIC BLOOD PRESSURE: 150 MMHG | DIASTOLIC BLOOD PRESSURE: 90 MMHG

## 2019-12-27 DIAGNOSIS — I10 ESSENTIAL HYPERTENSION: Primary | ICD-10-CM

## 2020-01-14 DIAGNOSIS — E78.5 HYPERLIPIDEMIA, UNSPECIFIED HYPERLIPIDEMIA TYPE: ICD-10-CM

## 2020-01-14 RX ORDER — ATORVASTATIN CALCIUM 80 MG/1
TABLET, FILM COATED ORAL
Qty: 90 TABLET | Refills: 1 | Status: SHIPPED | OUTPATIENT
Start: 2020-01-14 | End: 2020-07-01

## 2020-01-15 RX ORDER — METOPROLOL SUCCINATE 25 MG/1
TABLET, EXTENDED RELEASE ORAL
Qty: 180 TABLET | Refills: 1 | Status: SHIPPED | OUTPATIENT
Start: 2020-01-15 | End: 2020-07-02

## 2020-01-15 NOTE — TELEPHONE ENCOUNTER
Refill passed per [facility] protocol.   Cholesterol Medications  Protocol Criteria:  · Appointment scheduled in the past 12 months or in the next 3 months  · ALT & LDL on file in the past 12 months  · ALT result < 80  · LDL result <130   Recent Outpat

## 2020-01-15 NOTE — TELEPHONE ENCOUNTER
Serum CR is elevated. However it is a 5.56% increase  Component      Latest Ref Rng & Units 12/6/2019 2/5/2019   CREATININE      0.70 - 1.30 mg/dL 1.52 (H) 1.44     Meets protocol requirements. Refill sent.

## 2020-06-07 DIAGNOSIS — E78.5 HYPERLIPIDEMIA, UNSPECIFIED HYPERLIPIDEMIA TYPE: ICD-10-CM

## 2020-06-08 RX ORDER — ASPIRIN 81 MG/1
TABLET ORAL
Qty: 90 TABLET | Refills: 1 | Status: SHIPPED | OUTPATIENT
Start: 2020-06-08 | End: 2020-12-16

## 2020-06-29 RX ORDER — LOSARTAN POTASSIUM 50 MG/1
100 TABLET ORAL DAILY
Qty: 180 TABLET | Refills: 1 | Status: SHIPPED | OUTPATIENT
Start: 2020-06-29 | End: 2020-07-06 | Stop reason: ALTCHOICE

## 2020-06-30 ENCOUNTER — TELEPHONE (OUTPATIENT)
Dept: CARDIOLOGY CLINIC | Facility: CLINIC | Age: 70
End: 2020-06-30

## 2020-06-30 NOTE — TELEPHONE ENCOUNTER
Pharm stating this medication os on backorder and unavailable. Please recommend something else. Current Outpatient Medications:   •  losartan Potassium 50 MG Oral Tab, Take 2 tablets (100 mg total) by mouth daily. , Disp: 180 tablet, Rfl: 1

## 2020-07-01 DIAGNOSIS — E78.5 HYPERLIPIDEMIA, UNSPECIFIED HYPERLIPIDEMIA TYPE: ICD-10-CM

## 2020-07-01 RX ORDER — ATORVASTATIN CALCIUM 80 MG/1
TABLET, FILM COATED ORAL
Qty: 90 TABLET | Refills: 1 | Status: SHIPPED | OUTPATIENT
Start: 2020-07-01 | End: 2020-12-19

## 2020-07-01 NOTE — TELEPHONE ENCOUNTER
Refill was sent 6/29/20 meeting protocol. Per pharmacy Losartan is unavailable/backorder. Currently takes losartan potassium 50 mg tab, 100 mg daily. Spoke with pharmacy, confirmed currently all doses of losartan are out of stock on back order.   Ple

## 2020-07-02 RX ORDER — METOPROLOL SUCCINATE 25 MG/1
TABLET, EXTENDED RELEASE ORAL
Qty: 180 TABLET | Refills: 0 | Status: SHIPPED | OUTPATIENT
Start: 2020-07-02 | End: 2020-09-17

## 2020-07-02 NOTE — TELEPHONE ENCOUNTER
Last office visit note from December 2019 reviewed, no changes to medications. Patient instructed to follow up with APN in 6 months. Creatinine rising with last blood draw. Patient contacted, currently in [address] and will not be back for 4 weeks.   Please advise

## 2020-07-06 RX ORDER — VALSARTAN 40 MG/1
80 TABLET ORAL DAILY
Qty: 180 TABLET | Refills: 1 | Status: SHIPPED | OUTPATIENT
Start: 2020-07-06 | End: 2020-09-17

## 2020-07-06 NOTE — TELEPHONE ENCOUNTER
Re: lisinopril dose 20 mg. 10 mg tablet not covered by insurance would have to take 4 5 mg tabs. Re: valsartan 80 mg dose.  80 mg tab not covered by insurance, order entered for 40 mg tab, 80 mg dose to replace losartan (backorder)

## 2020-08-03 ENCOUNTER — NURSE TRIAGE (OUTPATIENT)
Dept: FAMILY MEDICINE CLINIC | Facility: CLINIC | Age: 70
End: 2020-08-03

## 2020-08-03 NOTE — TELEPHONE ENCOUNTER
Action Requested: Summary for Provider     []  Critical Lab, Recommendations Needed  [] Need Additional Advice  []   FYI    []   Need Orders  [] Need Medications Sent to Pharmacy  []  Other     SUMMARY: went to visit [place] , returned Friday 7-

## 2020-08-04 ENCOUNTER — HOSPITAL ENCOUNTER (OUTPATIENT)
Age: 70
Discharge: ANOTHER HOSPITAL | End: 2020-08-04
Payer: MEDICARE

## 2020-08-04 ENCOUNTER — APPOINTMENT (OUTPATIENT)
Dept: CT IMAGING | Facility: HOSPITAL | Age: 70
End: 2020-08-04
Attending: EMERGENCY MEDICINE
Payer: MEDICARE

## 2020-08-04 ENCOUNTER — HOSPITAL ENCOUNTER (EMERGENCY)
Facility: HOSPITAL | Age: 70
Discharge: HOME | End: 2020-08-04
Attending: EMERGENCY MEDICINE
Payer: MEDICARE

## 2020-08-04 VITALS
RESPIRATION RATE: 17 BRPM | DIASTOLIC BLOOD PRESSURE: 63 MMHG | SYSTOLIC BLOOD PRESSURE: 117 MMHG | TEMPERATURE: 98 F | HEART RATE: 76 BPM | OXYGEN SATURATION: 99 %

## 2020-08-04 VITALS
BODY MASS INDEX: 33.34 KG/M2 | HEART RATE: 75 BPM | OXYGEN SATURATION: 96 % | WEIGHT: 220 LBS | RESPIRATION RATE: 16 BRPM | SYSTOLIC BLOOD PRESSURE: 126 MMHG | TEMPERATURE: 100 F | HEIGHT: 68 IN | DIASTOLIC BLOOD PRESSURE: 76 MMHG

## 2020-08-04 DIAGNOSIS — M54.9 BACK PAIN WITH RADIATION: ICD-10-CM

## 2020-08-04 DIAGNOSIS — Z86.79 HISTORY OF CHF (CONGESTIVE HEART FAILURE): ICD-10-CM

## 2020-08-04 DIAGNOSIS — R10.31 RLQ ABDOMINAL PAIN: ICD-10-CM

## 2020-08-04 DIAGNOSIS — Z90.49 HISTORY OF CHOLECYSTECTOMY: ICD-10-CM

## 2020-08-04 DIAGNOSIS — Z20.822 ENCOUNTER FOR LABORATORY TESTING FOR COVID-19 VIRUS: Primary | ICD-10-CM

## 2020-08-04 DIAGNOSIS — R10.31 ABDOMINAL PAIN, RIGHT LOWER QUADRANT: Primary | ICD-10-CM

## 2020-08-04 DIAGNOSIS — Z86.79: ICD-10-CM

## 2020-08-04 DIAGNOSIS — R31.9 HEMATURIA, UNSPECIFIED TYPE: ICD-10-CM

## 2020-08-04 LAB
ANION GAP SERPL CALC-SCNC: 3 MMOL/L (ref 0–18)
BACTERIA UR QL AUTO: NEGATIVE /HPF
BASOPHILS # BLD AUTO: 0.02 X10(3) UL (ref 0–0.2)
BASOPHILS NFR BLD AUTO: 0.5 %
BILIRUB UR QL STRIP: NEGATIVE
BILIRUB UR QL: NEGATIVE
BUN BLD-MCNC: 19 MG/DL (ref 7–18)
BUN/CREAT SERPL: 11.9 (ref 10–20)
CALCIUM BLD-MCNC: 8.5 MG/DL (ref 8.5–10.1)
CHLORIDE SERPL-SCNC: 101 MMOL/L (ref 98–112)
CLARITY UR: CLEAR
CLARITY UR: CLEAR
CO2 SERPL-SCNC: 32 MMOL/L (ref 21–32)
COLOR UR: YELLOW
COLOR UR: YELLOW
CREAT BLD-MCNC: 1.6 MG/DL (ref 0.7–1.3)
DEPRECATED RDW RBC AUTO: 42.7 FL (ref 35.1–46.3)
EOSINOPHIL # BLD AUTO: 0.07 X10(3) UL (ref 0–0.7)
EOSINOPHIL NFR BLD AUTO: 1.6 %
ERYTHROCYTE [DISTWIDTH] IN BLOOD BY AUTOMATED COUNT: 13.8 % (ref 11–15)
GLUCOSE BLD-MCNC: 95 MG/DL (ref 70–99)
GLUCOSE UR STRIP-MCNC: NEGATIVE MG/DL
GLUCOSE UR-MCNC: NEGATIVE MG/DL
HCT VFR BLD AUTO: 47.3 % (ref 39–53)
HGB BLD-MCNC: 15.4 G/DL (ref 13–17.5)
IMM GRANULOCYTES # BLD AUTO: 0.01 X10(3) UL (ref 0–1)
IMM GRANULOCYTES NFR BLD: 0.2 %
KETONES UR STRIP-MCNC: NEGATIVE MG/DL
KETONES UR-MCNC: NEGATIVE MG/DL
LEUKOCYTE ESTERASE UR QL STRIP.AUTO: NEGATIVE
LEUKOCYTE ESTERASE UR QL STRIP: NEGATIVE
LYMPHOCYTES # BLD AUTO: 2.19 X10(3) UL (ref 1–4)
LYMPHOCYTES NFR BLD AUTO: 50.7 %
MCH RBC QN AUTO: 27.6 PG (ref 26–34)
MCHC RBC AUTO-ENTMCNC: 32.6 G/DL (ref 31–37)
MCV RBC AUTO: 84.8 FL (ref 80–100)
MONOCYTES # BLD AUTO: 0.76 X10(3) UL (ref 0.1–1)
MONOCYTES NFR BLD AUTO: 17.6 %
NEUTROPHILS # BLD AUTO: 1.27 X10 (3) UL (ref 1.5–7.7)
NEUTROPHILS # BLD AUTO: 1.27 X10(3) UL (ref 1.5–7.7)
NEUTROPHILS NFR BLD AUTO: 29.4 %
NITRITE UR QL STRIP.AUTO: NEGATIVE
NITRITE UR QL STRIP: NEGATIVE
OSMOLALITY SERPL CALC.SUM OF ELEC: 284 MOSM/KG (ref 275–295)
PH UR STRIP: 5.5 [PH]
PH UR: 5 [PH] (ref 5–8)
PLATELET # BLD AUTO: 164 10(3)UL (ref 150–450)
POTASSIUM SERPL-SCNC: 3.7 MMOL/L (ref 3.5–5.1)
PROT UR STRIP-MCNC: NEGATIVE MG/DL
PROT UR-MCNC: NEGATIVE MG/DL
RBC # BLD AUTO: 5.58 X10(6)UL (ref 3.8–5.8)
RBC #/AREA URNS AUTO: 5 /HPF
SODIUM SERPL-SCNC: 136 MMOL/L (ref 136–145)
SP GR UR STRIP: 1.02
SP GR UR STRIP: 1.02 (ref 1–1.03)
UROBILINOGEN UR STRIP-ACNC: 2
UROBILINOGEN UR STRIP-ACNC: <2 MG/DL
WBC # BLD AUTO: 4.3 X10(3) UL (ref 4–11)
WBC #/AREA URNS AUTO: 1 /HPF

## 2020-08-04 PROCEDURE — 99284 EMERGENCY DEPT VISIT MOD MDM: CPT

## 2020-08-04 PROCEDURE — 85025 COMPLETE CBC W/AUTO DIFF WBC: CPT | Performed by: EMERGENCY MEDICINE

## 2020-08-04 PROCEDURE — 81002 URINALYSIS NONAUTO W/O SCOPE: CPT | Performed by: EMERGENCY MEDICINE

## 2020-08-04 PROCEDURE — 74176 CT ABD & PELVIS W/O CONTRAST: CPT | Performed by: EMERGENCY MEDICINE

## 2020-08-04 PROCEDURE — 36415 COLL VENOUS BLD VENIPUNCTURE: CPT

## 2020-08-04 PROCEDURE — 99214 OFFICE O/P EST MOD 30 MIN: CPT | Performed by: EMERGENCY MEDICINE

## 2020-08-04 PROCEDURE — 81001 URINALYSIS AUTO W/SCOPE: CPT | Performed by: EMERGENCY MEDICINE

## 2020-08-04 PROCEDURE — 80048 BASIC METABOLIC PNL TOTAL CA: CPT | Performed by: EMERGENCY MEDICINE

## 2020-08-04 NOTE — ED PROVIDER NOTES
Patient Seen in: [facility]      History   Patient presents with:  Testing    Stated Complaint: COVID TEST    Chaitanya Lester is a 78year old  male here for covid testing after recent travel. Denies sx.  Medical hx as b Types: Cigarettes      Smokeless tobacco: Never Used    Alcohol use: No    Drug use: No             Review of Systems    Positive for stated complaint: COVID TEST  Other systems are as noted in HPI. Constitutional and vital signs reviewed.       All othe is 4. GCS verbal subscore is 5. GCS motor subscore is 6. Psychiatric:         Mood and Affect: Mood normal.         Behavior: Behavior normal.         Thought Content:  Thought content normal.         Judgment: Judgment normal.               ED Course

## 2020-08-04 NOTE — ED NOTES
Pt to be reassessed in [facility] ED, Per NP recommendation for RLQ pain. Pt confirmed understanding.

## 2020-08-05 ENCOUNTER — TELEPHONE (OUTPATIENT)
Dept: FAMILY MEDICINE CLINIC | Facility: CLINIC | Age: 70
End: 2020-08-05

## 2020-08-05 NOTE — ED INITIAL ASSESSMENT (HPI)
Patient sent from IC to r/o kidney stone after 4-5 day history of right flank/RLQ abd pain.  +blood in urine on exam.

## 2020-08-05 NOTE — ED PROVIDER NOTES
Patient Seen in: [facility] Emergency Department    History   Patient presents with:  Abdomen/Flank Pain      HPI    Patient presents to the ED for right lower quadrant abdominal pain for the past 4 to 5 days. Pain is been intermittent and sharp. Device None (Room air)       All measures to prevent infection transmission during my interaction with the patient were taken. The patient was already wearing a droplet mask on my arrival to the room.  Personal protective equipment including droplet mask, e Abnormal; Notable for the following components:    Neutrophil Absolute Prelim 1.27 (*)     Neutrophil Absolute 1.27 (*)     All other components within normal limits   CBC WITH DIFFERENTIAL WITH PLATELET    Narrative:      The following orders were created colitis, diverticulitis    Medical Record Review: I personally reviewed available prior medical records for any recent pertinent discharge summaries, testing, and procedures and reviewed those reports. Complicating Factors:  The patient already has does

## 2020-08-05 NOTE — TELEPHONE ENCOUNTER
Patient's spouse called in stating that patient was scheduled with Dr. Oren Dawson on 8/4. When patient arrived for visit they were instructed to go to [facility] to have Covid testing done and would not see patient. Patient went to [facility] and they told patient to go to ER for the rapid response Covid testing. Patient then went to the ER and did not get the Covid testing. He did get other testing completed for his symptoms. Spouse is asking if she should be concerned or is there a way he can still get the testing because he was out of town. Please advise.

## 2020-08-06 NOTE — TELEPHONE ENCOUNTER
Spouse advised results are still in process, we will call back when results are received, verbalized understanding.

## 2020-08-07 LAB — SARS-COV-2 BY PCR: DETECTED

## 2020-09-17 ENCOUNTER — LAB ENCOUNTER (OUTPATIENT)
Dept: LAB | Age: 70
End: 2020-09-17
Attending: FAMILY MEDICINE
Payer: MEDICARE

## 2020-09-17 ENCOUNTER — OFFICE VISIT (OUTPATIENT)
Dept: FAMILY MEDICINE CLINIC | Facility: CLINIC | Age: 70
End: 2020-09-17
Payer: MEDICARE

## 2020-09-17 VITALS
SYSTOLIC BLOOD PRESSURE: 121 MMHG | HEART RATE: 63 BPM | DIASTOLIC BLOOD PRESSURE: 77 MMHG | BODY MASS INDEX: 34.1 KG/M2 | HEIGHT: 68 IN | TEMPERATURE: 97 F | WEIGHT: 225 LBS | RESPIRATION RATE: 16 BRPM

## 2020-09-17 DIAGNOSIS — Z12.5 PROSTATE CANCER SCREENING: ICD-10-CM

## 2020-09-17 DIAGNOSIS — R05.8 ALLERGIC COUGH: ICD-10-CM

## 2020-09-17 DIAGNOSIS — I42.9 CARDIOMYOPATHY, UNSPECIFIED TYPE (HCC): Primary | ICD-10-CM

## 2020-09-17 DIAGNOSIS — E78.5 HYPERLIPIDEMIA, UNSPECIFIED HYPERLIPIDEMIA TYPE: ICD-10-CM

## 2020-09-17 DIAGNOSIS — Z00.00 MEDICARE ANNUAL WELLNESS VISIT, INITIAL: ICD-10-CM

## 2020-09-17 DIAGNOSIS — I50.22 SYSTOLIC CHF, CHRONIC (HCC): ICD-10-CM

## 2020-09-17 DIAGNOSIS — I10 ESSENTIAL HYPERTENSION: ICD-10-CM

## 2020-09-17 DIAGNOSIS — I45.2 RBBB (RIGHT BUNDLE BRANCH BLOCK WITH LEFT ANTERIOR FASCICULAR BLOCK): ICD-10-CM

## 2020-09-17 PROCEDURE — 81001 URINALYSIS AUTO W/SCOPE: CPT

## 2020-09-17 PROCEDURE — 80061 LIPID PANEL: CPT

## 2020-09-17 PROCEDURE — 80053 COMPREHEN METABOLIC PANEL: CPT

## 2020-09-17 PROCEDURE — 36415 COLL VENOUS BLD VENIPUNCTURE: CPT

## 2020-09-17 PROCEDURE — G0439 PPPS, SUBSEQ VISIT: HCPCS | Performed by: FAMILY MEDICINE

## 2020-09-17 PROCEDURE — 84443 ASSAY THYROID STIM HORMONE: CPT

## 2020-09-17 PROCEDURE — 83036 HEMOGLOBIN GLYCOSYLATED A1C: CPT

## 2020-09-17 PROCEDURE — 85025 COMPLETE CBC W/AUTO DIFF WBC: CPT

## 2020-09-17 RX ORDER — HYDROCHLOROTHIAZIDE 12.5 MG/1
12.5 CAPSULE, GELATIN COATED ORAL DAILY
Qty: 90 CAPSULE | Refills: 6 | Status: SHIPPED | OUTPATIENT
Start: 2020-09-17 | End: 2022-02-07

## 2020-09-17 RX ORDER — DEXTROMETHORPHAN HYDROBROMIDE AND PROMETHAZINE HYDROCHLORIDE 15; 6.25 MG/5ML; MG/5ML
5 SYRUP ORAL 4 TIMES DAILY PRN
Qty: 240 ML | Refills: 0 | Status: SHIPPED | OUTPATIENT
Start: 2020-09-17 | End: 2020-10-15

## 2020-09-17 RX ORDER — VALSARTAN 40 MG/1
80 TABLET ORAL DAILY
Qty: 180 TABLET | Refills: 1 | Status: SHIPPED | OUTPATIENT
Start: 2020-09-17 | End: 2021-04-07

## 2020-09-17 RX ORDER — METOPROLOL SUCCINATE 25 MG/1
25 TABLET, EXTENDED RELEASE ORAL 2 TIMES DAILY
Qty: 180 TABLET | Refills: 1 | Status: SHIPPED | OUTPATIENT
Start: 2020-09-17 | End: 2021-03-29

## 2020-09-17 NOTE — PROGRESS NOTES
HPI:    Patient ID: Jacki Luo is a 78year old male.     This patient is a 43-year-old -American male here for medical annual visit and for status update on any confirmed chronic medical illnesses and follow up on any previous labs or procedur years Colonoscopy due on 01/22/2002 Update [facility] if applicable    Flex Sigmoidoscopy Screen every 5 years No results found for this or any previous visit. No flowsheet data found.      Fecal Occult Blood Annually No results found for: FOB, OCCU visit. No flowsheet data found.         General Health     In the past six months, have you lost more than 10 pounds without trying?: (P) 2 - No    Has your appetite been poor?: (P) No    Type of Diet: (P) Balanced         How would you describe your daily of ?: (P) No    Do you have a living will?: (P) No     Hearing Assessment (Required for AWV/SWV)      Hearing Screening    Screening Method:  Questionnaire  I have a problem hearing over the telephone:  Sometimes I have trouble following the conver place, and time. ASSESSMENT/PLAN:   1. Medicare annual wellness visit, initial  General well exam and the survey has been completed. - CBC WITH DIFFERENTIAL WITH PLATELET; Future  - COMP METABOLIC PANEL (14); Future  - LIPID PANEL;  Future  - weight loss via daily exercising and consistent healthy dietary changes. Encouraged physical fitness and daily physical activity daily. Keep cardiologist appointments.       Return in about 4 months (around 1/17/2021), or if symptoms worsen or fail to imp

## 2020-09-25 ENCOUNTER — OFFICE VISIT (OUTPATIENT)
Dept: CARDIOLOGY CLINIC | Facility: CLINIC | Age: 70
End: 2020-09-25
Payer: MEDICARE

## 2020-09-25 VITALS
WEIGHT: 230 LBS | HEIGHT: 68 IN | DIASTOLIC BLOOD PRESSURE: 72 MMHG | SYSTOLIC BLOOD PRESSURE: 125 MMHG | HEART RATE: 72 BPM | TEMPERATURE: 97 F | BODY MASS INDEX: 34.86 KG/M2

## 2020-09-25 DIAGNOSIS — I42.9 CARDIOMYOPATHY, UNSPECIFIED TYPE (HCC): Primary | ICD-10-CM

## 2020-09-25 PROCEDURE — G0463 HOSPITAL OUTPT CLINIC VISIT: HCPCS | Performed by: NURSE PRACTITIONER

## 2020-09-25 PROCEDURE — 99214 OFFICE O/P EST MOD 30 MIN: CPT | Performed by: NURSE PRACTITIONER

## 2020-09-25 NOTE — PROGRESS NOTES
James Vela is a 78year old male. Patient presents with: Follow - Up: Pt of Dr. Kev Montero. Routine 6 month follow up. NO chest pain, SOB, palpitations, dizziness. Having some leg swelling after showers. HPI:   Patient comes in today for follow-up.  He 0.40        Years: 20.00        Pack years: 8        Types: Cigarettes      Smokeless tobacco: Never Used    Alcohol use: No    Drug use: No       REVIEW OF SYSTEMS:   GENERAL HEALTH: feels well otherwise  SKIN: denies any unusual skin lesions or rashes  R

## 2020-10-01 ENCOUNTER — HOSPITAL ENCOUNTER (OUTPATIENT)
Dept: CV DIAGNOSTICS | Facility: HOSPITAL | Age: 70
Discharge: HOME | End: 2020-10-01
Attending: NURSE PRACTITIONER
Payer: MEDICARE

## 2020-10-01 DIAGNOSIS — I42.9 CARDIOMYOPATHY, UNSPECIFIED TYPE (HCC): ICD-10-CM

## 2020-10-01 PROCEDURE — 93306 TTE W/DOPPLER COMPLETE: CPT | Performed by: NURSE PRACTITIONER

## 2020-10-15 ENCOUNTER — LAB ENCOUNTER (OUTPATIENT)
Dept: LAB | Age: 70
End: 2020-10-15
Attending: FAMILY MEDICINE
Payer: MEDICARE

## 2020-10-15 ENCOUNTER — OFFICE VISIT (OUTPATIENT)
Dept: FAMILY MEDICINE CLINIC | Facility: CLINIC | Age: 70
End: 2020-10-15
Payer: MEDICARE

## 2020-10-15 VITALS
WEIGHT: 234 LBS | BODY MASS INDEX: 36 KG/M2 | SYSTOLIC BLOOD PRESSURE: 127 MMHG | TEMPERATURE: 97 F | RESPIRATION RATE: 18 BRPM | DIASTOLIC BLOOD PRESSURE: 78 MMHG | HEART RATE: 69 BPM

## 2020-10-15 DIAGNOSIS — M10.9 ACUTE GOUT INVOLVING TOE OF RIGHT FOOT, UNSPECIFIED CAUSE: Primary | ICD-10-CM

## 2020-10-15 DIAGNOSIS — M10.9 ACUTE GOUT INVOLVING TOE OF RIGHT FOOT, UNSPECIFIED CAUSE: ICD-10-CM

## 2020-10-15 PROCEDURE — 85027 COMPLETE CBC AUTOMATED: CPT

## 2020-10-15 PROCEDURE — 36415 COLL VENOUS BLD VENIPUNCTURE: CPT

## 2020-10-15 PROCEDURE — 84550 ASSAY OF BLOOD/URIC ACID: CPT

## 2020-10-15 PROCEDURE — 99214 OFFICE O/P EST MOD 30 MIN: CPT | Performed by: FAMILY MEDICINE

## 2020-10-15 RX ORDER — PREDNISONE 20 MG/1
20 TABLET ORAL 2 TIMES DAILY
Qty: 10 TABLET | Refills: 0 | Status: SHIPPED | OUTPATIENT
Start: 2020-10-15 | End: 2020-10-20

## 2020-10-15 NOTE — PATIENT INSTRUCTIONS
Gout Diet  Gout is a painful condition caused by an excess of uric acid, a waste product made by the body. Uric acid forms crystals that collect in the joints. The immune response to these crystals brings on symptoms of joint pain and swelling.  This is c · Complex carbohydrate foods, including whole grains, brown rice, oats, and beans  · Coffee, in moderation  · Water, approximately 64 ounces per day  Follow-up care  Follow up with your healthcare provider as advised.   When to seek medical advice  Call you · Certain meats (red meat, processed meat, cantu, turkey, wild game, and goose)  · Sauces and gravies made with meat  · Organ meats (such as liver, kidneys, sweetbreads, and tripe)  · Legumes (such as dried beans and peas)  · Mushrooms, spinach, asparagus, Gout is a disease that affects the joints. Left untreated, it can lead to painful foot and joint deformities and even kidney problems. But, by treating gout early, you can relieve pain and help prevent future problems.  Gout can usually be treated with medi · Medicines to treat acute gout attacks, including NSAIDs (nonsteroidal anti-inflammatory medicines), steroids, and colchicine    Ck last reviewed this educational content on 4/1/2018  © 4871-4060 [employer].  [address]

## 2020-10-15 NOTE — PROGRESS NOTES
HPI: Reinier Santoyo is a 78year old male who presents for right foot pain. Pt reports that it is swelling and has extreme pain. Started on Sunday night. Started aching and throbbing. Having a hard time putting weight on it. No history of gout.  Pt has had pain

## 2020-10-22 ENCOUNTER — TELEPHONE (OUTPATIENT)
Dept: FAMILY MEDICINE CLINIC | Facility: CLINIC | Age: 70
End: 2020-10-22

## 2020-10-22 RX ORDER — INDOMETHACIN 50 MG/1
50 CAPSULE ORAL
Qty: 21 CAPSULE | Refills: 0 | Status: SHIPPED | OUTPATIENT
Start: 2020-10-22 | End: 2020-10-29

## 2020-10-22 NOTE — TELEPHONE ENCOUNTER
Spoke with pt's wife,  verified, she stated Dr Tanja Butt prescribed  prednisone to pt for gout, rx ineffective. Pt still c/o right foot pain and he can't move. She is req something that will work. pls send to [facility] on file.    pls advise, thanks i

## 2020-10-22 NOTE — TELEPHONE ENCOUNTER
Spoke with pt's wife per VIVIAN BEE verified  She was informed of MD recommendation, she stated understanding.

## 2020-12-16 DIAGNOSIS — E78.5 HYPERLIPIDEMIA, UNSPECIFIED HYPERLIPIDEMIA TYPE: ICD-10-CM

## 2020-12-16 RX ORDER — ASPIRIN 81 MG/1
TABLET ORAL
Qty: 90 TABLET | Refills: 1 | Status: SHIPPED | OUTPATIENT
Start: 2020-12-16 | End: 2021-06-06

## 2020-12-18 DIAGNOSIS — E78.5 HYPERLIPIDEMIA, UNSPECIFIED HYPERLIPIDEMIA TYPE: ICD-10-CM

## 2020-12-19 RX ORDER — ATORVASTATIN CALCIUM 80 MG/1
TABLET, FILM COATED ORAL
Qty: 90 TABLET | Refills: 1 | Status: SHIPPED | OUTPATIENT
Start: 2020-12-19 | End: 2021-06-14

## 2021-01-20 ENCOUNTER — TELEPHONE (OUTPATIENT)
Dept: FAMILY MEDICINE CLINIC | Facility: CLINIC | Age: 71
End: 2021-01-20

## 2021-01-20 NOTE — TELEPHONE ENCOUNTER
----- Message from Manuelito Muñiz sent at 1/20/2021  5:20 PM CST -----  Regarding: Prescription Question  Contact: [phone]  Daisy Owen is having a gout flare up and the previous prescription that was given did not help.  I could not get him an appointme

## 2021-01-21 ENCOUNTER — OFFICE VISIT (OUTPATIENT)
Dept: FAMILY MEDICINE CLINIC | Facility: CLINIC | Age: 71
End: 2021-01-21
Payer: MEDICARE

## 2021-01-21 VITALS
TEMPERATURE: 96 F | WEIGHT: 244 LBS | SYSTOLIC BLOOD PRESSURE: 128 MMHG | HEART RATE: 73 BPM | DIASTOLIC BLOOD PRESSURE: 75 MMHG | RESPIRATION RATE: 18 BRPM | BODY MASS INDEX: 37 KG/M2

## 2021-01-21 DIAGNOSIS — M10.9 ACUTE GOUT, UNSPECIFIED CAUSE, UNSPECIFIED SITE: Primary | ICD-10-CM

## 2021-01-21 PROCEDURE — 99213 OFFICE O/P EST LOW 20 MIN: CPT | Performed by: FAMILY MEDICINE

## 2021-01-21 RX ORDER — COLCHICINE 0.6 MG/1
TABLET ORAL
Qty: 10 TABLET | Refills: 0 | Status: SHIPPED | OUTPATIENT
Start: 2021-01-21 | End: 2021-01-23

## 2021-01-21 NOTE — TELEPHONE ENCOUNTER
Spoke with spouse Miranda Márquez (ROHIT and  verified)--returning Lisseth's call from yesterday. Agrees with in office aoot today with Dr. Berlin Mijares at 8 a.m.--appt made. Travel screen negative. No further questions/concerns at this time.

## 2021-01-21 NOTE — PROGRESS NOTES
HPI: Reinier Santoyo is a 78year old male who presents for gout flare up. Had similar episode in October. Started Prednisone which did not help. We then started Indomethacin. Pain resolved on its own. Pt reports he was watching a movie and ate pecans.   Pain sta

## 2021-02-01 DIAGNOSIS — Z23 NEED FOR VACCINATION: ICD-10-CM

## 2021-02-22 ENCOUNTER — OFFICE VISIT (OUTPATIENT)
Dept: FAMILY MEDICINE CLINIC | Facility: CLINIC | Age: 71
End: 2021-02-22
Payer: MEDICARE

## 2021-02-22 VITALS
RESPIRATION RATE: 18 BRPM | WEIGHT: 240 LBS | SYSTOLIC BLOOD PRESSURE: 110 MMHG | HEIGHT: 68 IN | DIASTOLIC BLOOD PRESSURE: 72 MMHG | HEART RATE: 71 BPM | BODY MASS INDEX: 36.37 KG/M2 | TEMPERATURE: 97 F

## 2021-02-22 DIAGNOSIS — E78.2 MIXED HYPERLIPIDEMIA: ICD-10-CM

## 2021-02-22 DIAGNOSIS — M10.9 ACUTE GOUT INVOLVING TOE OF RIGHT FOOT, UNSPECIFIED CAUSE: Primary | ICD-10-CM

## 2021-02-22 DIAGNOSIS — I10 ESSENTIAL HYPERTENSION: ICD-10-CM

## 2021-02-22 DIAGNOSIS — R04.0 LEFT-SIDED EPISTAXIS: ICD-10-CM

## 2021-02-22 DIAGNOSIS — I42.0 DILATED CARDIOMYOPATHY (HCC): ICD-10-CM

## 2021-02-22 PROCEDURE — 99214 OFFICE O/P EST MOD 30 MIN: CPT | Performed by: FAMILY MEDICINE

## 2021-02-22 RX ORDER — ALLOPURINOL 300 MG/1
300 TABLET ORAL DAILY
Qty: 90 TABLET | Refills: 1 | Status: SHIPPED | OUTPATIENT
Start: 2021-02-22 | End: 2021-08-16

## 2021-02-22 NOTE — PATIENT INSTRUCTIONS
Medication reviewed and renewed where needed and appropriate. Comply with medications. Monitor blood pressures and record at home. Limit salt intake. Recommend weight loss via daily exercising and consistent healthy dietary changes.   Considering startin hours as possible. How can I prevent gout? With a little effort, you may be able to prevent gout attacks in the future. Here are some things you can do:  · Don't eat foods high in purines  ? Certain meats (red meat, processed meat, turkey)  ?  Organ meats syrup  · Certain fish, including anchovies, sardines, fish eggs, and herring  · Shellfish  · Certain meats, such as red meat, hot dogs, luncheon meats, and turkey  · Organ meats, such as liver, kidneys, and sweetbreads  · Legumes, such as dried beans and p arthritis caused by an excess of uric acid. This is a waste product made by the body. It builds up in the body and forms crystals that collect in the joints, causing a gout attack. Alcohol and certain foods can trigger a gout attack.  Below are some guideli help prevent inflammation due to gout. Gout guidelines  The following guidelines are recommended by the Academy of Nutrition and Dietetics for people with gout.  Your diet should be:   · High in fiber, whole grains, fruits, and vegetables  · Low in protein prevent a gout attack, avoid these foods:  · Alcohol (particularly beer, but also red wine and spirits)  · Certain meats (red meat, processed meat, turkey)  · Organ meats (kidney, liver, sweetbread)  · Shellfish (lobster, crab, shrimp, scallop, mussel)  ·

## 2021-02-22 NOTE — PROGRESS NOTES
HPI:    Patient ID: Lita Moreno is a 73year old male.     This patient is a 68-year-old -American well-established at our clinic who is doing follow-up regarding his recent gout flare and successful treatment with the colchicine as prescribed b Neck: No thyromegaly present. Cardiovascular: Normal rate and regular rhythm. Exam reveals no gallop. Murmur heard. Pulmonary/Chest: Effort normal and breath sounds normal.   Neurological: He is alert and oriented to person, place, and time.  No cran medicines may reduce pain and prevent attacks in the future. There are also some things you can do at home to relieve symptoms. Medicines for gout  Your healthcare provider may prescribe a daily medicine to reduce levels of uric acid.  Reducing your uric a a substitute for professional medical care. Always follow your healthcare professional's instructions. Gout Diet  Gout is a painful condition caused by an excess of uric acid, a waste product made by the body.  Uric acid forms crystals that collect i prevent inflammation due to gout.   · Dairy products that are low-fat or fat-free, such as cheese and yogurt  · Complex carbohydrate foods, including whole grains, brown rice, oats, and beans  · Coffee, in moderation  · Water, approximately 64 ounces per [employer] liquor and red wine). You may be told to give up alcohol completely.   · Certain fish (anchovies, sardines, fish roes, herring, tuna, mussels, codfish, scallops, trout, and immanuel)  · Certain meats (red meat, processed meat, cantu, turkey, wild game, and g kidney problems. But, by treating gout early, you can relieve pain and help prevent future problems. Gout can usually be treated with medicine and proper diet. In severe cases, surgery may be needed. What causes gout?   Gout is caused by an excess of uric Ck last reviewed this educational content on 4/1/2018  © 6161-8010 [employer]. All rights reserved. This information is not intended as a substitute for professional medical care.  Always follow your healthcare professional's instructio

## 2021-03-04 ENCOUNTER — IMMUNIZATION (OUTPATIENT)
Dept: LAB | Facility: HOSPITAL | Age: 71
End: 2021-03-04
Attending: HOSPITALIST
Payer: MEDICARE

## 2021-03-04 DIAGNOSIS — Z23 NEED FOR VACCINATION: Primary | ICD-10-CM

## 2021-03-04 PROCEDURE — 0011A SARSCOV2 VAC 100MCG/0.5ML IM: CPT

## 2021-03-28 DIAGNOSIS — I10 ESSENTIAL HYPERTENSION: ICD-10-CM

## 2021-03-29 RX ORDER — METOPROLOL SUCCINATE 25 MG/1
TABLET, EXTENDED RELEASE ORAL
Qty: 180 TABLET | Refills: 1 | Status: SHIPPED | OUTPATIENT
Start: 2021-03-29

## 2021-04-06 ENCOUNTER — IMMUNIZATION (OUTPATIENT)
Dept: LAB | Facility: HOSPITAL | Age: 71
End: 2021-04-06
Attending: EMERGENCY MEDICINE
Payer: MEDICARE

## 2021-04-06 DIAGNOSIS — Z23 NEED FOR VACCINATION: Primary | ICD-10-CM

## 2021-04-06 PROCEDURE — 0012A SARSCOV2 VAC 100MCG/0.5ML IM: CPT

## 2021-04-07 DIAGNOSIS — I10 ESSENTIAL HYPERTENSION: ICD-10-CM

## 2021-04-07 RX ORDER — VALSARTAN 40 MG/1
TABLET ORAL
Qty: 180 TABLET | Refills: 1 | Status: SHIPPED | OUTPATIENT
Start: 2021-04-07 | End: 2021-10-03

## 2021-06-06 DIAGNOSIS — E78.5 HYPERLIPIDEMIA, UNSPECIFIED HYPERLIPIDEMIA TYPE: ICD-10-CM

## 2021-06-06 RX ORDER — ASPIRIN 81 MG/1
TABLET, COATED ORAL
Qty: 90 TABLET | Refills: 1 | Status: SHIPPED | OUTPATIENT
Start: 2021-06-06

## 2021-06-14 DIAGNOSIS — E78.5 HYPERLIPIDEMIA, UNSPECIFIED HYPERLIPIDEMIA TYPE: ICD-10-CM

## 2021-06-14 RX ORDER — ATORVASTATIN CALCIUM 80 MG/1
TABLET, FILM COATED ORAL
Qty: 90 TABLET | Refills: 1 | Status: SHIPPED | OUTPATIENT
Start: 2021-06-14 | End: 2021-12-05

## 2021-06-15 ENCOUNTER — OFFICE VISIT (OUTPATIENT)
Dept: FAMILY MEDICINE CLINIC | Facility: CLINIC | Age: 71
End: 2021-06-15
Payer: MEDICARE

## 2021-06-15 ENCOUNTER — HOSPITAL ENCOUNTER (OUTPATIENT)
Dept: GENERAL RADIOLOGY | Age: 71
Discharge: HOME | End: 2021-06-15
Attending: FAMILY MEDICINE
Payer: MEDICARE

## 2021-06-15 ENCOUNTER — LAB ENCOUNTER (OUTPATIENT)
Dept: LAB | Age: 71
End: 2021-06-15
Attending: FAMILY MEDICINE
Payer: MEDICARE

## 2021-06-15 VITALS
BODY MASS INDEX: 37.44 KG/M2 | RESPIRATION RATE: 17 BRPM | HEIGHT: 68 IN | TEMPERATURE: 96 F | HEART RATE: 85 BPM | SYSTOLIC BLOOD PRESSURE: 112 MMHG | DIASTOLIC BLOOD PRESSURE: 76 MMHG | WEIGHT: 247 LBS

## 2021-06-15 DIAGNOSIS — R05.3 PERSISTENT COUGH FOR 3 WEEKS OR LONGER: Primary | ICD-10-CM

## 2021-06-15 DIAGNOSIS — M10.9 ACUTE GOUT INVOLVING TOE OF RIGHT FOOT, UNSPECIFIED CAUSE: ICD-10-CM

## 2021-06-15 DIAGNOSIS — I10 ESSENTIAL HYPERTENSION: ICD-10-CM

## 2021-06-15 DIAGNOSIS — I42.0 DILATED CARDIOMYOPATHY (HCC): ICD-10-CM

## 2021-06-15 DIAGNOSIS — I50.22 SYSTOLIC CHF, CHRONIC (HCC): ICD-10-CM

## 2021-06-15 DIAGNOSIS — R05.3 PERSISTENT COUGH FOR 3 WEEKS OR LONGER: ICD-10-CM

## 2021-06-15 PROCEDURE — 85025 COMPLETE CBC W/AUTO DIFF WBC: CPT

## 2021-06-15 PROCEDURE — 36415 COLL VENOUS BLD VENIPUNCTURE: CPT

## 2021-06-15 PROCEDURE — 99214 OFFICE O/P EST MOD 30 MIN: CPT | Performed by: FAMILY MEDICINE

## 2021-06-15 PROCEDURE — 84550 ASSAY OF BLOOD/URIC ACID: CPT

## 2021-06-15 PROCEDURE — 71046 X-RAY EXAM CHEST 2 VIEWS: CPT | Performed by: FAMILY MEDICINE

## 2021-06-15 PROCEDURE — 80053 COMPREHEN METABOLIC PANEL: CPT

## 2021-06-15 PROCEDURE — 83880 ASSAY OF NATRIURETIC PEPTIDE: CPT

## 2021-06-15 NOTE — PROGRESS NOTES
HPI/Subjective:   Patient ID: Gus Malhotra is a 73year old male.     This patient is a 62-year-old -American male who is well-established in our clinic with a history of dilated cardiomyopathy as well as according to cardiology assessments has c and Rhythm: Normal rate. Heart sounds: Murmur heard. No gallop. Pulmonary:      Effort: Pulmonary effort is normal. No respiratory distress. Breath sounds: Normal breath sounds. No wheezing, rhonchi or rales.    Neurological:      General: No (around 7/6/2021), or if symptoms worsen or fail to improve.

## 2021-08-16 RX ORDER — ALLOPURINOL 300 MG/1
TABLET ORAL
Qty: 90 TABLET | Refills: 1 | Status: SHIPPED | OUTPATIENT
Start: 2021-08-16 | End: 2022-02-07

## 2021-10-02 DIAGNOSIS — I10 ESSENTIAL HYPERTENSION: ICD-10-CM

## 2021-10-03 RX ORDER — VALSARTAN 40 MG/1
TABLET ORAL
Qty: 180 TABLET | Refills: 1 | Status: SHIPPED | OUTPATIENT
Start: 2021-10-03

## 2021-10-18 ENCOUNTER — LAB ENCOUNTER (OUTPATIENT)
Dept: LAB | Age: 71
End: 2021-10-18
Attending: FAMILY MEDICINE
Payer: MEDICARE

## 2021-10-18 ENCOUNTER — OFFICE VISIT (OUTPATIENT)
Dept: FAMILY MEDICINE CLINIC | Facility: CLINIC | Age: 71
End: 2021-10-18
Payer: MEDICARE

## 2021-10-18 VITALS
BODY MASS INDEX: 37.1 KG/M2 | HEIGHT: 68 IN | HEART RATE: 82 BPM | DIASTOLIC BLOOD PRESSURE: 76 MMHG | WEIGHT: 244.81 LBS | SYSTOLIC BLOOD PRESSURE: 110 MMHG

## 2021-10-18 DIAGNOSIS — R61 UNEXPLAINED NIGHT SWEATS: ICD-10-CM

## 2021-10-18 DIAGNOSIS — R05.3 PERSISTENT COUGH: ICD-10-CM

## 2021-10-18 DIAGNOSIS — R05.3 PERSISTENT COUGH: Primary | ICD-10-CM

## 2021-10-18 DIAGNOSIS — R09.82 POST-NASAL DRIP: ICD-10-CM

## 2021-10-18 DIAGNOSIS — R05.8 ALLERGIC COUGH: ICD-10-CM

## 2021-10-18 PROCEDURE — 86480 TB TEST CELL IMMUN MEASURE: CPT

## 2021-10-18 PROCEDURE — 36415 COLL VENOUS BLD VENIPUNCTURE: CPT

## 2021-10-18 PROCEDURE — 90662 IIV NO PRSV INCREASED AG IM: CPT | Performed by: FAMILY MEDICINE

## 2021-10-18 PROCEDURE — G0008 ADMIN INFLUENZA VIRUS VAC: HCPCS | Performed by: FAMILY MEDICINE

## 2021-10-18 PROCEDURE — 99214 OFFICE O/P EST MOD 30 MIN: CPT | Performed by: FAMILY MEDICINE

## 2021-10-18 RX ORDER — LEVOCETIRIZINE DIHYDROCHLORIDE 5 MG/1
5 TABLET, FILM COATED ORAL EVERY EVENING
Qty: 30 TABLET | Refills: 1 | Status: SHIPPED | OUTPATIENT
Start: 2021-10-18 | End: 2021-11-09

## 2021-10-18 NOTE — PROGRESS NOTES
Subjective:   Patient ID: Rosie Gardner is a 73year old male.     This patient is a 65-year-old -American gentleman who presents today with a continuation of a complaint of typically dry to semiproductive cough and it is physician related as the Exam  Constitutional:       General: He is not in acute distress. Appearance: He is obese. HENT:      Nose: Mucosal edema and congestion present. Left Nostril: Epistaxis present. Right Turbinates: Enlarged, swollen and pale.       Left Turbi drip thus eliminating the patient's cough. I doubt but we will have to consider CT of the chest if these above regimens of treatment do not help. Return if symptoms worsen or fail to improve.

## 2021-10-18 NOTE — PATIENT INSTRUCTIONS
Patient is taking valsartan which is a ARB which is somewhat chemically related to the ACE inhibitors which are known to create a chronic cough.  We may have to consider taking the patient off of this medication at least for a short season to see if the cou

## 2021-11-09 DIAGNOSIS — R05.3 PERSISTENT COUGH: ICD-10-CM

## 2021-11-09 DIAGNOSIS — R09.82 POST-NASAL DRIP: ICD-10-CM

## 2021-11-09 DIAGNOSIS — R05.8 ALLERGIC COUGH: ICD-10-CM

## 2021-11-09 RX ORDER — LEVOCETIRIZINE DIHYDROCHLORIDE 5 MG/1
5 TABLET, FILM COATED ORAL EVERY EVENING
Qty: 90 TABLET | Refills: 1 | Status: SHIPPED | OUTPATIENT
Start: 2021-11-09 | End: 2023-11-24

## 2021-11-09 NOTE — TELEPHONE ENCOUNTER
Refill passed per [address] protocol.   Requested Prescriptions   Pending Prescriptions Disp Refills    LEVOCETIRIZINE 5 MG Oral Tab [Pharmacy Med Name: LEVOCETIRIZINE 5 MG TABLET] 30 tablet 1     Sig: TAKE 1 TABLET BY MOUTH EVERY DAY IN THE EVENING        Allergy Medication Protocol Passed - 11/9/2021  8:30 AM        Passed - Appoinment in past 12 or next 3 months             Recent Outpatient Visits              3 weeks ago Persistent cough    [address] [place], DO    Office Visit    4 months ago Persistent cough for 3 weeks or longer    [address] [place], DO    Office Visit    8 months ago Acute gout involving toe of right foot, unspecified cause    [address] [place], [place] Moorman, DO    Office Visit    9 months ago Acute gout, unspecified cause, unspecified site    [address] [place], [place]    Office Visit    1 year ago Acute gout involving toe of right foot, unspecified cause    [address] [place], [place]    Office Visit

## 2021-12-03 DIAGNOSIS — E78.5 HYPERLIPIDEMIA, UNSPECIFIED HYPERLIPIDEMIA TYPE: ICD-10-CM

## 2021-12-05 RX ORDER — ATORVASTATIN CALCIUM 80 MG/1
TABLET, FILM COATED ORAL
Qty: 90 TABLET | Refills: 1 | Status: SHIPPED | OUTPATIENT
Start: 2021-12-05

## 2022-02-07 RX ORDER — HYDROCHLOROTHIAZIDE 12.5 MG/1
12.5 CAPSULE, GELATIN COATED ORAL DAILY
Qty: 90 CAPSULE | Refills: 0 | Status: SHIPPED | OUTPATIENT
Start: 2022-02-07

## 2022-02-07 RX ORDER — ALLOPURINOL 300 MG/1
300 TABLET ORAL DAILY
Qty: 90 TABLET | Refills: 1 | Status: SHIPPED | OUTPATIENT
Start: 2022-02-07

## 2022-02-08 NOTE — TELEPHONE ENCOUNTER
Please review refill protocol failed/ no protocol  Requested Prescriptions   Pending Prescriptions Disp Refills    ALLOPURINOL 300 MG Oral Tab [Pharmacy Med Name: ALLOPURINOL 300 MG TABLET] 90 tablet 1     Sig: TAKE 1 TABLET BY MOUTH EVERY DAY        There is no refill protocol information for this order

## 2022-02-08 NOTE — TELEPHONE ENCOUNTER
Please review. Protocol failed.  LOV 10/18/2021, GFR 49 June 2021  Requested Prescriptions   Pending Prescriptions Disp Refills    HYDROCHLOROTHIAZIDE 12.5 MG Oral Cap [Pharmacy Med Name: HYDROCHLOROTHIAZIDE 12.5 MG CP] 90 capsule 6     Sig: TAKE 1 CAPSULE BY MOUTH EVERY DAY        Hypertensive Medications Protocol Failed - 2/6/2022  7:08 AM        Failed - GFR  > 50     Lab Results   Component Value Date    GFRAA 52 (L) 06/15/2021                 Passed - CMP or BMP in past 12 months        Passed - Appointment in past 6 or next 3 months              Recent Outpatient Visits              3 months ago Persistent cough    [place] [address] Bened[place], DO    Office Visit    7 months ago Persistent cough for 3 weeks or longer    [place] [address] [place], DO    Office Visit    11 months ago Acute gout involving toe of right foot, unspecified cause    [address] [place], Trey Stewart DO    Office Visit    1 year ago Acute gout, unspecified cause, unspecified site    [address], [place], [place]    Office Visit    1 year ago Acute gout involving toe of right foot, unspecified cause    [address], [place], [place]    Office Visit

## 2022-03-20 RX ORDER — VALSARTAN 40 MG/1
80 TABLET ORAL DAILY
Qty: 180 TABLET | Refills: 1 | Status: SHIPPED | OUTPATIENT
Start: 2022-03-20

## 2022-03-20 NOTE — TELEPHONE ENCOUNTER
Please review; protocol failed/no protocol.   Requested Prescriptions   Pending Prescriptions Disp Refills    VALSARTAN 40 MG Oral Tab [Pharmacy Med Name: VALSARTAN 40 MG TABLET] 180 tablet 1     Sig: TAKE 2 TABLETS BY MOUTH EVERY DAY        Hypertensive Medications Protocol Failed - 3/19/2022 12:14 PM        Failed - GFR  > 50     Lab Results   Component Value Date    GFRAA 52 (L) 06/15/2021                 Passed - CMP or BMP in past 12 months        Passed - Appointment in past 6 or next 3 months            Recent Outpatient Visits              5 months ago Persistent cough    [facility] Ardean Grimes,     Office Visit    9 months ago Persistent cough for 3 weeks or longer    [address] Ar[facility] Gr[place],     Office Visit    1 year ago Acute gout involving toe of right foot, unspecified cause    [facility], Yang Krishnamurthy, [place],     Office Visit    1 year ago Acute gout, unspecified cause, unspecified site    [facility], [address], Ramya [place]    Office Visit    1 year ago Acute gout involving toe of right foot, unspecified cause    [facility] [employer] [facility], [address], Ramya [place],     Office Visit          Future Appointments         Provider Department Appt Notes    In 1 month Nataliya Husbands, DO [facility] [employer] [facility], Yang Krishnamurthy, Jamee thompson Feet swelling

## 2022-04-21 ENCOUNTER — LABORATORY ENCOUNTER (OUTPATIENT)
Dept: LAB | Age: 72
End: 2022-04-21
Attending: INTERNAL MEDICINE
Payer: MEDICARE

## 2022-04-21 DIAGNOSIS — E78.5 HYPERLIPEMIA: Primary | ICD-10-CM

## 2022-04-21 DIAGNOSIS — I50.20 SYSTOLIC CONGESTIVE HEART FAILURE (HCC): ICD-10-CM

## 2022-04-21 DIAGNOSIS — I10 HYPERTENSION: ICD-10-CM

## 2022-04-21 LAB
ALBUMIN SERPL-MCNC: 3.8 G/DL (ref 3.4–5)
ALBUMIN/GLOB SERPL: 1 {RATIO} (ref 1–2)
ALP LIVER SERPL-CCNC: 144 U/L
ALT SERPL-CCNC: 36 U/L
ANION GAP SERPL CALC-SCNC: 5 MMOL/L (ref 0–18)
AST SERPL-CCNC: 20 U/L (ref 15–37)
BASOPHILS # BLD AUTO: 0.06 X10(3) UL (ref 0–0.2)
BASOPHILS NFR BLD AUTO: 0.8 %
BILIRUB SERPL-MCNC: 0.5 MG/DL (ref 0.1–2)
BUN BLD-MCNC: 19 MG/DL (ref 7–18)
BUN/CREAT SERPL: 12 (ref 10–20)
CALCIUM BLD-MCNC: 9.5 MG/DL (ref 8.5–10.1)
CHLORIDE SERPL-SCNC: 106 MMOL/L (ref 98–112)
CHOLEST SERPL-MCNC: 128 MG/DL (ref ?–200)
CO2 SERPL-SCNC: 31 MMOL/L (ref 21–32)
CREAT BLD-MCNC: 1.58 MG/DL
DEPRECATED RDW RBC AUTO: 47.3 FL (ref 35.1–46.3)
EOSINOPHIL # BLD AUTO: 0.66 X10(3) UL (ref 0–0.7)
EOSINOPHIL NFR BLD AUTO: 8.3 %
ERYTHROCYTE [DISTWIDTH] IN BLOOD BY AUTOMATED COUNT: 14.3 % (ref 11–15)
FASTING PATIENT LIPID ANSWER: YES
FASTING STATUS PATIENT QL REPORTED: YES
GLOBULIN PLAS-MCNC: 4 G/DL (ref 2.8–4.4)
GLUCOSE BLD-MCNC: 130 MG/DL (ref 70–99)
HCT VFR BLD AUTO: 48.8 %
HDLC SERPL-MCNC: 45 MG/DL (ref 40–59)
HGB BLD-MCNC: 15.2 G/DL
IMM GRANULOCYTES # BLD AUTO: 0.02 X10(3) UL (ref 0–1)
IMM GRANULOCYTES NFR BLD: 0.3 %
LDLC SERPL CALC-MCNC: 67 MG/DL (ref ?–100)
LYMPHOCYTES # BLD AUTO: 2.76 X10(3) UL (ref 1–4)
LYMPHOCYTES NFR BLD AUTO: 34.5 %
MCH RBC QN AUTO: 28.1 PG (ref 26–34)
MCHC RBC AUTO-ENTMCNC: 31.1 G/DL (ref 31–37)
MCV RBC AUTO: 90.2 FL
MONOCYTES # BLD AUTO: 0.89 X10(3) UL (ref 0.1–1)
MONOCYTES NFR BLD AUTO: 11.1 %
NEUTROPHILS # BLD AUTO: 3.6 X10 (3) UL (ref 1.5–7.7)
NEUTROPHILS # BLD AUTO: 3.6 X10(3) UL (ref 1.5–7.7)
NEUTROPHILS NFR BLD AUTO: 45 %
NONHDLC SERPL-MCNC: 83 MG/DL (ref ?–130)
OSMOLALITY SERPL CALC.SUM OF ELEC: 298 MOSM/KG (ref 275–295)
PLATELET # BLD AUTO: 200 10(3)UL (ref 150–450)
POTASSIUM SERPL-SCNC: 4.1 MMOL/L (ref 3.5–5.1)
PROT SERPL-MCNC: 7.8 G/DL (ref 6.4–8.2)
RBC # BLD AUTO: 5.41 X10(6)UL
SODIUM SERPL-SCNC: 142 MMOL/L (ref 136–145)
TRIGL SERPL-MCNC: 83 MG/DL (ref 30–149)
VLDLC SERPL CALC-MCNC: 12 MG/DL (ref 0–30)
WBC # BLD AUTO: 8 X10(3) UL (ref 4–11)

## 2022-04-21 PROCEDURE — 80053 COMPREHEN METABOLIC PANEL: CPT

## 2022-04-21 PROCEDURE — 80061 LIPID PANEL: CPT

## 2022-04-21 PROCEDURE — 85025 COMPLETE CBC W/AUTO DIFF WBC: CPT

## 2022-04-21 PROCEDURE — 36415 COLL VENOUS BLD VENIPUNCTURE: CPT

## 2022-04-26 ENCOUNTER — OFFICE VISIT (OUTPATIENT)
Dept: FAMILY MEDICINE CLINIC | Facility: CLINIC | Age: 72
End: 2022-04-26
Payer: MEDICARE

## 2022-04-26 VITALS
HEART RATE: 76 BPM | DIASTOLIC BLOOD PRESSURE: 78 MMHG | WEIGHT: 248 LBS | SYSTOLIC BLOOD PRESSURE: 110 MMHG | BODY MASS INDEX: 37.59 KG/M2 | TEMPERATURE: 98 F | HEIGHT: 68 IN

## 2022-04-26 DIAGNOSIS — I80.8 SUPERFICIAL THROMBOPHLEBITIS OF LEFT UPPER EXTREMITY: ICD-10-CM

## 2022-04-26 DIAGNOSIS — J30.9 ALLERGIC RHINITIS, UNSPECIFIED SEASONALITY, UNSPECIFIED TRIGGER: ICD-10-CM

## 2022-04-26 DIAGNOSIS — I10 ESSENTIAL HYPERTENSION: ICD-10-CM

## 2022-04-26 DIAGNOSIS — M79.641 CHRONIC HAND PAIN, RIGHT: Primary | ICD-10-CM

## 2022-04-26 DIAGNOSIS — R39.15 URINARY URGENCY: ICD-10-CM

## 2022-04-26 DIAGNOSIS — G89.29 CHRONIC HAND PAIN, RIGHT: Primary | ICD-10-CM

## 2022-04-26 PROCEDURE — 99214 OFFICE O/P EST MOD 30 MIN: CPT | Performed by: FAMILY MEDICINE

## 2022-04-26 NOTE — PATIENT INSTRUCTIONS
Patient be referred to orthopedic/plastics for an evaluation of right hand third digit flexor Dupuytren's contracture disorder. 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

## 2022-05-03 ENCOUNTER — NURSE ONLY (OUTPATIENT)
Dept: GASTROENTEROLOGY | Facility: CLINIC | Age: 72
End: 2022-05-03

## 2022-05-03 DIAGNOSIS — Z12.11 SCREEN FOR COLON CANCER: Primary | ICD-10-CM

## 2022-05-03 NOTE — PROGRESS NOTES
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 Hx of systolic CHF, chronic     GI MD preference: Seen by you in 2014   Insurance:  MEDICARE  CBC from: 4/21/2022- no anemia noted   PCP visit on: 10/18/2021    Last Procedure, Date, MD:  Previous colon 2009 @ [facility] pt. Believes polyps were found    11/12/14- Cholecystectomy     MRI abd+MRCP on 10/20/2014- Conclusion \"Cholelithiasis without MRCP findings of acute cholecystitis although   ultrasound remains a more sensitive modality. No biliary ductal dilatation   or choledocholithiasis. \"    L[facility]pez Quarto on 9/6/2014- conclusion \"There is complete shadowing from the gallbladder compatible   with a gallbladder filled with gallstones. There is a positive sonographic   Linder sign. Correlate clinically. Can't exclude acute cholecystitis. Dilated common hepatic duct measuring one cm. Correlate clinically and with   MRCP findings. No intrahepatic ductal dilatation. It is difficult to   exclude choledocholithiasis distally. 2.8 cm size cyst in the midpole   region of the right kidney. \"    Anticoagulants: no   Diabetic Meds: no   BP meds(Ace inhibitors/ARB's): VALSARTAN- AM dose   Weight loss meds (phentermine/vyvanse): no  Iron supplement (RX/OTC): no  Height & Weight/BMI: 5'8\"/248lbs/37  Hx of Cardiac/CVA issues/(MI/Stroke): no   Devices Pacemaker/Defibrillator/Stents: no   Resp. Issues/Oxygen Use/DOUGLAS/COPD: no  Issues w/Anesthesia: no    Symptoms (Y/N): no    Family history of colon cancer: no    Medications, pharmacy, and allergies verified with patient over the phone. Please advise on orders and prep, thank you.

## 2022-05-04 RX ORDER — POLYETHYLENE GLYCOL 3350, SODIUM SULFATE ANHYDROUS, SODIUM BICARBONATE, SODIUM CHLORIDE, POTASSIUM CHLORIDE 236; 22.74; 6.74; 5.86; 2.97 G/4L; G/4L; G/4L; G/4L; G/4L
4 POWDER, FOR SOLUTION ORAL ONCE
Qty: 1 EACH | Refills: 0 | Status: SHIPPED | OUTPATIENT
Start: 2022-05-04 | End: 2022-05-04

## 2022-05-05 RX ORDER — HYDROCHLOROTHIAZIDE 12.5 MG/1
CAPSULE, GELATIN COATED ORAL
Qty: 90 CAPSULE | Refills: 1 | Status: SHIPPED | OUTPATIENT
Start: 2022-05-05

## 2022-05-12 ENCOUNTER — OFFICE VISIT (OUTPATIENT)
Dept: SURGERY | Facility: CLINIC | Age: 72
End: 2022-05-12
Payer: MEDICARE

## 2022-05-12 DIAGNOSIS — M79.641 PAIN IN RIGHT HAND: Primary | ICD-10-CM

## 2022-05-12 DIAGNOSIS — M19.041 PRIMARY OSTEOARTHRITIS OF RIGHT HAND: ICD-10-CM

## 2022-05-12 PROCEDURE — 99203 OFFICE O/P NEW LOW 30 MIN: CPT | Performed by: PLASTIC SURGERY

## 2022-05-12 RX ORDER — POLYETHYLENE GLYCOL 3350, SODIUM SULFATE ANHYDROUS, SODIUM BICARBONATE, SODIUM CHLORIDE, POTASSIUM CHLORIDE 236; 22.74; 6.74; 5.86; 2.97 G/4L; G/4L; G/4L; G/4L; G/4L
4 POWDER, FOR SOLUTION ORAL ONCE
COMMUNITY
Start: 2022-05-04

## 2022-05-19 ENCOUNTER — MED REC SCAN ONLY (OUTPATIENT)
Dept: FAMILY MEDICINE CLINIC | Facility: CLINIC | Age: 72
End: 2022-05-19

## 2022-05-29 DIAGNOSIS — E78.5 HYPERLIPIDEMIA, UNSPECIFIED HYPERLIPIDEMIA TYPE: ICD-10-CM

## 2022-05-29 RX ORDER — ATORVASTATIN CALCIUM 80 MG/1
TABLET, FILM COATED ORAL
Qty: 90 TABLET | Refills: 1 | Status: SHIPPED | OUTPATIENT
Start: 2022-05-29

## 2022-05-29 NOTE — TELEPHONE ENCOUNTER
Refill passed per [facility] protocol   Requested Prescriptions   Pending Prescriptions Disp Refills    ATORVASTATIN 80 MG Oral Tab [Pharmacy Med Name: ATORVASTATIN 80 MG TABLET] 90 tablet 1     Sig: TAKE 1 TABLET BY MOUTH EVERY DAY        Cholesterol Medication Protocol Passed - 5/29/2022  7:20 AM        Passed - ALT in past 12 months        Passed - LDL in past 12 months        Passed - Last ALT < 80       Lab Results   Component Value Date    ALT 36 04/21/2022             Passed - Last LDL < 130     Lab Results   Component Value Date    LDL 67 04/21/2022               Passed - Appointment in past 12 or next 3 months

## 2022-06-24 NOTE — TELEPHONE ENCOUNTER
5/12/22 JOD: [facility] - [facility] treated for lyme disease based on positive test results. IOP elevated still. Steroid responder. Patient has only been doing Timolol qday. Recommend increasing to BID OD. Call transferred to RN from [facility], spouse states pt called her to schedule appt, he is having tingling in his face. Informed spouse need to talk with pt. Spouse states pt can be contacted at [phone].     Actions Requested: requesting appt for tingling in

## 2022-08-01 RX ORDER — ALLOPURINOL 300 MG/1
TABLET ORAL
Qty: 90 TABLET | Refills: 1 | Status: SHIPPED | OUTPATIENT
Start: 2022-08-01

## 2022-09-12 DIAGNOSIS — I10 ESSENTIAL HYPERTENSION: ICD-10-CM

## 2022-09-12 RX ORDER — VALSARTAN 40 MG/1
80 TABLET ORAL DAILY
Qty: 180 TABLET | Refills: 1 | Status: SHIPPED | OUTPATIENT
Start: 2022-09-12

## 2022-09-12 NOTE — TELEPHONE ENCOUNTER
Refill passed per [address] protocol.   Requested Prescriptions   Pending Prescriptions Disp Refills    VALSARTAN 40 MG Oral Tab [Pharmacy Med Name: VALSARTAN 40 MG TABLET] 180 tablet 1     Sig: TAKE 2 TABLETS BY MOUTH EVERY DAY        Hypertensive Medications Protocol Passed - 9/12/2022 12:20 AM        Passed - In person appointment in the past 12 or next 3 months       Recent Outpatient Visits              4 months ago Pain in right hand    [address] [facility],2Nd Floor, [address] [facility],[address], [place] Dudley Villeda MD    Office Visit    4 months ago Screen for colon cancer    [address] [facility] PROCEDURE    Nurse Only    4 months ago Chronic hand pain, right    [address], [place], [place]    Office Visit    10 months ago Persistent cough    [address] [place] [address] L[address] Kyle, [address], [place], DO    Office Visit    1 year ago Persistent cough for 3 weeks or longer    [facility] [address], [address], [place], DO    Office Visit     Future Appointments         Provider Department Appt Notes    In 1 month [place], PROCEDURE [address] [facility] PROCEDURE Krystina @ [address] [place] [address]                Passed - Last BP reading less than 140/90     BP Readings from Last 1 Encounters:  04/26/22 : 110/78                Passed - CMP or BMP in past 6 months     Recent Results (from the past 4392 hour(s))   COMP METABOLIC PANEL (14)    Collection Time: 04/21/22 11:44 AM   Result Value Ref Range    Glucose 130 (H) 70 - 99 mg/dL    Sodium 142 136 - 145 mmol/L    Potassium 4.1 3.5 - 5.1 mmol/L    Chloride 106 98 - 112 mmol/L    CO2 31.0 21.0 - 32.0 mmol/L    Anion Gap 5 0 - 18 mmol/L    BUN 19 (H) 7 - 18 mg/dL    Creatinine 1.58 (H) 0.70 - 1.30 mg/dL    BUN/CREA Ratio 12.0 10.0 - 20.0    Calcium, Total 9.5 8.5 - 10.1 mg/dL    Calculated Osmolality 298 (H) 275 - 295 mOsm/kg    GFR, Non- 44 (L) >=60    GFR, -American 50 (L) >=60    ALT 36 16 - 61 U/L    AST 20 15 - 37 U/L    Alkaline Phosphatase 144 (H) 45 - 117 U/L    Bilirubin, Total 0.5 0.1 - 2.0 mg/dL    Total Protein 7.8 6.4 - 8.2 g/dL    Albumin 3.8 3.4 - 5.0 g/dL    Globulin  4.0 2.8 - 4.4 g/dL    A/G Ratio 1.0 1.0 - 2.0    Patient Fasting for CMP? Yes      *Note: Due to a large number of results and/or encounters for the requested time period, some results have not been displayed. A complete set of results can be found in Results Review.                  Passed - In person appointment or virtual visit in the past 6 months       Recent Outpatient Visits              4 months ago Pain in right hand    [address] [facility],[address] Floor, [place], Atul Schafer MD    Office Visit    4 months ago Screen for colon cancer    [address] [facility] PROCEDURE    Nurse Only    4 months ago Chronic hand pain, right    [address] [place], [address], [place], Lisa Emmanuel, DO    Office Visit    10 months ago Persistent cough    [address] [place], [address], [place], Lisa Emmanuel, DO    Office Visit    1 year ago Persistent cough for 3 weeks or longer    [address] [place], [address], [place], Lisa Benitez, DO    Office Visit     Future Appointments         Provider Department Appt Notes    In 1 month [address], PROCEDURE [address] [facility] PROCEDURE Krystina @ [facility]                Passed - GFR > 50     No results found for: [facility]                  Recent Outpatient Visits              4 months ago Pain in right hand    [place] Atul Barros MD    Office Visit    4 months ago Screen for colon cancer    [address] [facility] PROCEDURE    Nurse Only    4 months ago Chronic hand pain, right    [address] [place], [address], [place], Lisa Emmanuel, DO    Office Visit    10 months ago Persistent cough    [address] [place], [address], [place], Lisa Emmanuel, DO    Office Visit    1 year ago Persistent cough for 3 weeks or longer    [facility] [facility], Arturo Adler, DO    Office Visit          Future Appointments         Provider Department Appt Notes    In 1 month [place] PROCEDURE [address] [facility] PROCEDURE Krystina @ [address] [place] [address]

## 2022-10-18 ENCOUNTER — ANESTHESIA EVENT (OUTPATIENT)
Dept: ENDOSCOPY | Facility: HOSPITAL | Age: 72
End: 2022-10-18
Payer: MEDICARE

## 2022-10-18 ENCOUNTER — ANESTHESIA (OUTPATIENT)
Dept: ENDOSCOPY | Facility: HOSPITAL | Age: 72
End: 2022-10-18
Payer: MEDICARE

## 2022-10-18 ENCOUNTER — HOSPITAL ENCOUNTER (OUTPATIENT)
Facility: HOSPITAL | Age: 72
Setting detail: HOSPITAL OUTPATIENT SURGERY
Discharge: HOME | End: 2022-10-18
Attending: INTERNAL MEDICINE | Admitting: INTERNAL MEDICINE
Payer: MEDICARE

## 2022-10-18 VITALS
OXYGEN SATURATION: 100 % | BODY MASS INDEX: 36.68 KG/M2 | DIASTOLIC BLOOD PRESSURE: 83 MMHG | WEIGHT: 242 LBS | HEIGHT: 68 IN | TEMPERATURE: 97 F | RESPIRATION RATE: 19 BRPM | SYSTOLIC BLOOD PRESSURE: 115 MMHG | HEART RATE: 77 BPM

## 2022-10-18 DIAGNOSIS — Z12.11 SCREEN FOR COLON CANCER: ICD-10-CM

## 2022-10-18 PROCEDURE — 0DBK8ZX EXCISION OF ASCENDING COLON, VIA NATURAL OR ARTIFICIAL OPENING ENDOSCOPIC, DIAGNOSTIC: ICD-10-PCS | Performed by: INTERNAL MEDICINE

## 2022-10-18 PROCEDURE — 45385 COLONOSCOPY W/LESION REMOVAL: CPT | Performed by: INTERNAL MEDICINE

## 2022-10-18 PROCEDURE — 0DBN8ZX EXCISION OF SIGMOID COLON, VIA NATURAL OR ARTIFICIAL OPENING ENDOSCOPIC, DIAGNOSTIC: ICD-10-PCS | Performed by: INTERNAL MEDICINE

## 2022-10-18 RX ORDER — SODIUM CHLORIDE, SODIUM LACTATE, POTASSIUM CHLORIDE, CALCIUM CHLORIDE 600; 310; 30; 20 MG/100ML; MG/100ML; MG/100ML; MG/100ML
INJECTION, SOLUTION INTRAVENOUS CONTINUOUS
Status: DISCONTINUED | OUTPATIENT
Start: 2022-10-18 | End: 2022-10-18

## 2022-10-18 RX ORDER — LIDOCAINE HYDROCHLORIDE 10 MG/ML
INJECTION, SOLUTION EPIDURAL; INFILTRATION; INTRACAUDAL; PERINEURAL AS NEEDED
Status: DISCONTINUED | OUTPATIENT
Start: 2022-10-18 | End: 2022-10-18 | Stop reason: SURG

## 2022-10-18 RX ADMIN — SODIUM CHLORIDE, SODIUM LACTATE, POTASSIUM CHLORIDE, CALCIUM CHLORIDE: 600; 310; 30; 20 INJECTION, SOLUTION INTRAVENOUS at 11:20:00

## 2022-10-18 RX ADMIN — LIDOCAINE HYDROCHLORIDE 50 MG: 10 INJECTION, SOLUTION EPIDURAL; INFILTRATION; INTRACAUDAL; PERINEURAL at 10:36:00

## 2022-10-18 RX ADMIN — SODIUM CHLORIDE, SODIUM LACTATE, POTASSIUM CHLORIDE, CALCIUM CHLORIDE: 600; 310; 30; 20 INJECTION, SOLUTION INTRAVENOUS at 10:35:00

## 2022-10-18 NOTE — ANESTHESIA POSTPROCEDURE EVALUATION
Patient: Faviola Maciel    Procedure Summary     Date: 10/18/22 Room / Location: [address] [place] [address] ENDOSCOPY 05 / 300 [facility] ENDOSCOPY    Anesthesia Start: 0334 Anesthesia Stop:     Procedure: COLONOSCOPY-SCREENING (N/A ) Diagnosis:       Screen for colon cancer      (Colon polyps, diverticulosis, hemorrhoids)    Surgeons: Pierre Mchugh MD Anesthesiologist: Ty Bahena CRNA    Anesthesia Type: MAC ASA Status: 2          Anesthesia Type: MAC    Vitals Value Taken Time   /63 10/18/22 1120   Temp 36.5 10/18/22 1120   Pulse 90 10/18/22 1120   Resp 16 10/18/22 1120   SpO2 100 10/18/22 1120       [facility] AN Post Evaluation:   Patient Evaluated in PACU  Patient Participation: complete - patient participated  Level of Consciousness: awake and alert  Pain Score: 0  Pain Management: adequate  Airway Patency:patent  Yes    Cardiovascular Status: acceptable  Respiratory Status: acceptable  Postoperative Hydration acceptable      Roselia Loredo CRNA  10/18/2022 11:20 AM

## 2022-11-07 ENCOUNTER — TELEPHONE (OUTPATIENT)
Facility: CLINIC | Age: 72
End: 2022-11-07

## 2022-11-07 NOTE — TELEPHONE ENCOUNTER
Results letter mailed to patient per   Colon recall entered for repeat in 5 yrs,10/18/2027  Colon done in 10/18/2022  HM Updated and Patient Outreach was placed for Colon recall    Sushma Vega MD  P Em Gi Clinical Staff  GI RNs - 1.  Please print and mail this letter to patient; 2. Recall for colonoscopy exam in 5 years

## 2022-11-21 ENCOUNTER — ORDER TRANSCRIPTION (OUTPATIENT)
Dept: ADMINISTRATIVE | Facility: HOSPITAL | Age: 72
End: 2022-11-21

## 2022-11-21 DIAGNOSIS — R93.1 ABNORMAL FINDINGS ON DIAGNOSTIC IMAGING OF HEART AND CORONARY CIRCULATION: ICD-10-CM

## 2022-11-21 DIAGNOSIS — R94.31 ABNORMAL ECG: ICD-10-CM

## 2022-11-21 DIAGNOSIS — R94.39 ABNORMAL CARDIOVASCULAR STRESS TEST: Primary | ICD-10-CM

## 2022-11-24 DIAGNOSIS — E78.5 HYPERLIPIDEMIA, UNSPECIFIED HYPERLIPIDEMIA TYPE: ICD-10-CM

## 2022-11-24 RX ORDER — ATORVASTATIN CALCIUM 80 MG/1
TABLET, FILM COATED ORAL
Qty: 90 TABLET | Refills: 1 | Status: SHIPPED | OUTPATIENT
Start: 2022-11-24

## 2022-11-24 NOTE — TELEPHONE ENCOUNTER
Refill passed per [facility] protocol.   Requested Prescriptions   Pending Prescriptions Disp Refills    ATORVASTATIN 80 MG Oral Tab [Pharmacy Med Name: ATORVASTATIN 80 MG TABLET] 90 tablet 1     Sig: TAKE 1 TABLET BY MOUTH EVERY DAY       Cholesterol Medication Protocol Passed - 11/24/2022 12:21 AM        Passed - ALT in past 12 months        Passed - LDL in past 12 months        Passed - Last ALT < 80     Lab Results   Component Value Date    ALT 36 04/21/2022             Passed - Last LDL < 130     Lab Results   Component Value Date    LDL 67 04/21/2022             Passed - In person appointment or virtual visit in the past 12 mos or appointment in next 3 mos     Recent Outpatient Visits              6 months ago Pain in right hand    Deepika Mazariegos MD    Office Visit    6 months ago Screen for colon cancer    [address] GI PROCEDURE    Nurse Only    7 months ago Chronic hand pain, right    [facility], [address], [place], [place] Must[place], DO    Office Visit    1 year ago Persistent cough    [facility], [address], [place], [place] Must[place], DO    Office Visit    1 year ago Persistent cough for 3 weeks or longer    [facility], [address], [place], [place] Must[place], DO    Office Visit                         Recent Outpatient Visits              6 months ago Pain in right hand    Deepika Mazariegos MD    Office Visit    6 months ago Screen for colon cancer    [address] GI PROCEDURE    Nurse Only    7 months ago Chronic hand pain, right    [facility], [address], [place], [place] Must[place], DO    Office Visit    1 year ago Persistent cough    [facility], [address], [place], [place] Must[place], DO    Office Visit    1 year ago Persistent cough for 3 weeks or longer    [facility], [address], Yi Kay J, DO    Office Visit

## 2022-12-02 DIAGNOSIS — I10 ESSENTIAL HYPERTENSION: ICD-10-CM

## 2022-12-03 NOTE — TELEPHONE ENCOUNTER
Please review. Protocol failed / No Protocol. Requested Prescriptions   Pending Prescriptions Disp Refills    HYDROCHLOROTHIAZIDE 12.5 MG Oral Cap [Pharmacy Med Name: HYDROCHLOROTHIAZIDE 12.5 MG CP] 90 capsule 1     Sig: TAKE 1 CAPSULE BY MOUTH EVERY DAY       Hypertensive Medications Protocol Failed - 12/2/2022  6:15 PM        Failed - CMP or BMP in past 6 months     No results found for this or any previous visit (from the past 4392 hour(s)).             Failed - In person appointment or virtual visit in the past 6 months     Recent Outpatient Visits              6 months ago Pain in right hand    [address] [facility],2[address] Floor, [address], Jerry Vazquez MD    Office Visit    7 months ago Screen for colon cancer    [facility]"" GI PROCEDURE    Nurse Only    7 months ago Chronic hand pain, right    [facility], [address], [place], [place]    Office Visit    1 year ago Persistent cough    [facility], [facility], [address], [place], Arturo Adler, DO    Office Visit    1 year ago Persistent cough for 3 weeks or longer    [address], Arturo Adler, DO    Office Visit                      Passed - In person appointment in the past 12 or next 3 months     Recent Outpatient Visits              6 months ago Pain in right hand    [address] [facility],[address] Floor, [address], Jerry Vazquez MD    Office Visit    7 months ago Screen for [place] cancer    \[facility]\"" GI PROCEDURE    Nurse Only    7 months ago Chronic hand pain, right    [facility], [facility], [address], [place], Arturohal Adler, DO    Office Visit    1 year ago Persistent cough    [facility], [address], [place] Arturohal Adler, DO    Office Visit    1 year ago Persistent cough for 3 weeks or longer    [address], DO    Office Visit                      Passed - Last BP reading less than 140/90     BP Readings from Last 1 Encounters:  10/18/22 : 115/83              Passed - EGFRCR or GFRAA > 50     GFR Evaluation  GFRAA: 50 , resulted on 4/21/2022

## 2022-12-07 RX ORDER — HYDROCHLOROTHIAZIDE 12.5 MG/1
CAPSULE, GELATIN COATED ORAL
Qty: 90 CAPSULE | Refills: 1 | Status: SHIPPED | OUTPATIENT
Start: 2022-12-07

## 2022-12-07 NOTE — TELEPHONE ENCOUNTER
Please review. Protocol failed/ No protocol      Requested Prescriptions   Pending Prescriptions Disp Refills    HYDROCHLOROTHIAZIDE 12.5 MG Oral Cap [Pharmacy Med Name: HYDROCHLOROTHIAZIDE 12.5 MG CP] 90 capsule 1     Sig: TAKE 1 CAPSULE BY MOUTH EVERY DAY       Hypertensive Medications Protocol Failed - 12/2/2022  8:10 PM        Failed - CMP or BMP in past 6 months     No results found for this or any previous visit (from the past 4392 hour(s)).             Failed - In person appointment or virtual visit in the past 6 months     Recent Outpatient Visits              6 months ago Pain in right hand    [address] [facility],[address], [address], Jazmine Blunt MD    Office Visit    7 months ago Screen for colon cancer    [facility] GI PROCEDURE    Nurse Only    7 months ago Chronic hand pain, right    [address] [place], [address], [place] Anast[place] Tawnya, DO    Office Visit    1 year ago Persistent cough    [address] [place], [address], [place] An[place] Tawnya, DO    Office Visit    1 year ago Persistent cough for 3 weeks or longer    [address], [place], Anastasia Tawnya, DO    Office Visit          Future Appointments         Provider Department Appt Notes    In 1 month Roula Baum, [facility], [facility] up [facility]               Passed - In person appointment in the past 12 or next 3 months     Recent Outpatient Visits              6 months ago Pain in right hand    [address] [facility],[address], [address], Jazmine Blunt MD    Office Visit    7 months ago Screen for colon cancer    [facility] GI PROCEDURE    Nurse Only    7 months ago Chronic hand pain, right    [address] [place], [address], [place] An[place] Tawnya, DO    Office Visit    1 year ago Persistent cough    [address] [place], [address], [place] Tawnya, DO    Office Visit    1 year ago Persistent cough for 3 weeks or longer    [address] [facility] [place], [facility]    Office Visit          Future Appointments         Provider Department Appt Notes    In 1 month Yazmin Handy DO [address], [place], [address] Follow up visit               Passed - Last BP reading less than 140/90     BP Readings from Last 1 Encounters:  10/18/22 : 115/83              Passed - EGFRCR or GFRAA > 50     GFR Evaluation  GFRAA: 50 , resulted on 4/21/2022               Future Appointments         Provider Department Appt Notes    In 1 month Yazmin Handy DO [address], [place], [address] Follow up visit             Recent Outpatient Visits              6 months ago Pain in right hand    [place] Brenda Barros MD    Office Visit    7 months ago Screen for colon cancer    [facility]    Nurse Only    7 months ago Chronic hand pain, right    [address] [place], [place],     Office Visit    1 year ago Persistent cough    [address] [place], [place], DO    Office Visit    1 year ago Persistent cough for 3 weeks or longer    [address] [place], [place], [place]    Office Visit

## 2023-01-26 RX ORDER — ALLOPURINOL 300 MG/1
TABLET ORAL
Qty: 90 TABLET | Refills: 1 | Status: SHIPPED | OUTPATIENT
Start: 2023-01-26

## 2023-02-21 ENCOUNTER — OFFICE VISIT (OUTPATIENT)
Dept: FAMILY MEDICINE CLINIC | Facility: CLINIC | Age: 73
End: 2023-02-21

## 2023-02-21 VITALS
BODY MASS INDEX: 37.28 KG/M2 | RESPIRATION RATE: 19 BRPM | DIASTOLIC BLOOD PRESSURE: 78 MMHG | WEIGHT: 246 LBS | HEIGHT: 68 IN | OXYGEN SATURATION: 98 % | HEART RATE: 67 BPM | SYSTOLIC BLOOD PRESSURE: 120 MMHG

## 2023-02-21 DIAGNOSIS — J30.9 ALLERGIC RHINITIS, UNSPECIFIED SEASONALITY, UNSPECIFIED TRIGGER: ICD-10-CM

## 2023-02-21 DIAGNOSIS — Z12.5 PROSTATE CANCER SCREENING: ICD-10-CM

## 2023-02-21 DIAGNOSIS — I42.9 CARDIOMYOPATHY, UNSPECIFIED TYPE (HCC): ICD-10-CM

## 2023-02-21 DIAGNOSIS — R05.8 ALLERGIC COUGH: ICD-10-CM

## 2023-02-21 DIAGNOSIS — Z23 NEED FOR SHINGLES VACCINE: Primary | ICD-10-CM

## 2023-02-21 DIAGNOSIS — I10 ESSENTIAL HYPERTENSION: ICD-10-CM

## 2023-02-21 RX ORDER — LEVOCETIRIZINE DIHYDROCHLORIDE 5 MG/1
5 TABLET, FILM COATED ORAL EVERY EVENING
Qty: 30 TABLET | Refills: 0 | Status: SHIPPED | OUTPATIENT
Start: 2023-02-21

## 2023-02-21 NOTE — PATIENT INSTRUCTIONS
PSA needs to be updated. Patient might need to be seen by neurology in lieu of disequilibrium seemingly triggered by hyperextension of his neck (the concern would be vertebral artery insufficiency). Medication reviewed and renewed where needed and appropriate. Comply with medications. Monitor blood pressures and record at home. Limit salt intake.

## 2023-03-05 DIAGNOSIS — I10 ESSENTIAL HYPERTENSION: ICD-10-CM

## 2023-03-05 RX ORDER — VALSARTAN 40 MG/1
TABLET ORAL
Qty: 180 TABLET | Refills: 1 | Status: SHIPPED | OUTPATIENT
Start: 2023-03-05

## 2023-03-15 DIAGNOSIS — J30.9 ALLERGIC RHINITIS, UNSPECIFIED SEASONALITY, UNSPECIFIED TRIGGER: ICD-10-CM

## 2023-03-15 DIAGNOSIS — R05.8 ALLERGIC COUGH: ICD-10-CM

## 2023-03-15 RX ORDER — LEVOCETIRIZINE DIHYDROCHLORIDE 5 MG/1
TABLET, FILM COATED ORAL
Qty: 90 TABLET | Refills: 3 | Status: SHIPPED | OUTPATIENT
Start: 2023-03-15

## 2023-05-21 DIAGNOSIS — E78.5 HYPERLIPIDEMIA, UNSPECIFIED HYPERLIPIDEMIA TYPE: ICD-10-CM

## 2023-05-22 RX ORDER — ATORVASTATIN CALCIUM 80 MG/1
TABLET, FILM COATED ORAL
Qty: 90 TABLET | Refills: 1 | Status: SHIPPED | OUTPATIENT
Start: 2023-05-22

## 2023-05-22 NOTE — TELEPHONE ENCOUNTER
Please review. Protocol failed.   Requested Prescriptions   Pending Prescriptions Disp Refills    ATORVASTATIN 80 MG Oral Tab [Pharmacy Med Name: ATORVASTATIN 80 MG TABLET] 90 tablet 1     Sig: TAKE 1 TABLET BY MOUTH EVERY DAY       Cholesterol Medication Protocol Failed - 5/21/2023  7:31 AM        Failed - ALT in past 12 months        Failed - LDL in past 12 months        Failed - Last ALT < 80     Lab Results   Component Value Date    ALT 36 04/21/2022             Failed - Last LDL < 130     Lab Results   Component Value Date    LDL 67 04/21/2022               Passed - In person appointment or virtual visit in the past 12 mos or appointment in next 3 mos     Recent Outpatient Visits              3 months ago Need for shingles vaccine    [address] [facility], [place], [place]    Office Visit    1 year ago Pain in right hand    Matty Srivastava, [address], Sofie Alvarado MD    Office Visit    1 year ago Screen for colon cancer    [facility] Srivastava, [address], [place]    Nurse Only    1 year ago Chronic hand pain, right    [address], [place]     Office Visit    1 year ago Persistent cough    [facility], [address], [place], [place]    Office Visit

## 2023-06-01 DIAGNOSIS — I10 ESSENTIAL HYPERTENSION: ICD-10-CM

## 2023-06-01 RX ORDER — HYDROCHLOROTHIAZIDE 12.5 MG/1
CAPSULE, GELATIN COATED ORAL
Qty: 90 CAPSULE | Refills: 1 | Status: SHIPPED | OUTPATIENT
Start: 2023-06-01

## 2023-07-20 RX ORDER — ALLOPURINOL 300 MG/1
300 TABLET ORAL DAILY
Qty: 90 TABLET | Refills: 3 | Status: SHIPPED | OUTPATIENT
Start: 2023-07-20

## 2023-08-29 DIAGNOSIS — I10 ESSENTIAL HYPERTENSION: ICD-10-CM

## 2023-08-29 RX ORDER — VALSARTAN 40 MG/1
80 TABLET ORAL DAILY
Qty: 180 TABLET | Refills: 1 | Status: SHIPPED | OUTPATIENT
Start: 2023-08-29

## 2023-09-13 ENCOUNTER — TELEPHONE (OUTPATIENT)
Dept: FAMILY MEDICINE CLINIC | Facility: CLINIC | Age: 73
End: 2023-09-13

## 2023-10-05 ENCOUNTER — TELEPHONE (OUTPATIENT)
Dept: FAMILY MEDICINE CLINIC | Facility: CLINIC | Age: 73
End: 2023-10-05

## 2023-10-11 ENCOUNTER — LAB ENCOUNTER (OUTPATIENT)
Dept: LAB | Facility: REFERENCE LAB | Age: 73
End: 2023-10-11
Attending: FAMILY MEDICINE
Payer: MEDICARE

## 2023-10-11 DIAGNOSIS — Z12.5 PROSTATE CANCER SCREENING: ICD-10-CM

## 2023-10-11 LAB — COMPLEXED PSA SERPL-MCNC: 4.97 NG/ML (ref ?–4)

## 2023-10-11 PROCEDURE — 36415 COLL VENOUS BLD VENIPUNCTURE: CPT

## 2023-10-15 DIAGNOSIS — R97.20 ELEVATED PSA, LESS THAN 10 NG/ML: Primary | ICD-10-CM

## 2023-10-27 ENCOUNTER — ORDER TRANSCRIPTION (OUTPATIENT)
Dept: ADMINISTRATIVE | Facility: HOSPITAL | Age: 73
End: 2023-10-27

## 2023-10-27 DIAGNOSIS — I42.9 CARDIOMYOPATHY, UNSPECIFIED (HCC): ICD-10-CM

## 2023-10-27 DIAGNOSIS — R94.31 ABNORMAL ECG: ICD-10-CM

## 2023-10-27 DIAGNOSIS — R94.39 ABNORMAL CARDIOVASCULAR STRESS TEST: Primary | ICD-10-CM

## 2023-10-27 DIAGNOSIS — R93.1 ABNORMAL FINDINGS ON DIAGNOSTIC IMAGING OF HEART AND CORONARY CIRCULATION: ICD-10-CM

## 2023-10-27 DIAGNOSIS — R06.09 EXERTIONAL DYSPNEA: ICD-10-CM

## 2023-11-04 DIAGNOSIS — I10 ESSENTIAL HYPERTENSION: ICD-10-CM

## 2023-11-06 NOTE — TELEPHONE ENCOUNTER
Please review; protocol failed. No active /future labs noted   Message sent for patient to make an appointment. Requested Prescriptions   Pending Prescriptions Disp Refills    METOPROLOL SUCCINATE ER 25 MG Oral Tablet 24 Hr [Pharmacy Med Name: METOPROLOL SUCC ER 25 MG TAB] 180 tablet 3     Sig: TAKE 1 TABLET BY MOUTH TWICE A DAY       Hypertensive Medications Protocol Failed - 11/4/2023  8:20 AM        Failed - CMP or BMP in past 6 months     No results found for this or any previous visit (from the past 4392 hour(s)). Failed - In person appointment or virtual visit in the past 6 months     Recent Outpatient Visits              8 months ago Need for shingles vaccine    [facility], [place] [address], [place], [place]    Office Visit    1 year ago Pain in right hand    [address], [address], [place] Akira Becker MD    Office Visit    1 year ago Screen for colon cancer    [address], [address], [place]    Nurse Only    1 year ago Chronic hand pain, right    [address], [place] [address], [place], [place] Gar[place],     Office Visit    2 years ago Persistent cough    [facility], [place] [address], [facility] Jar[place],     Office Visit          Future Appointments           Provider Department     In 2 weeks [facility] RN RADIOLOGY 3; [facility]  [facility] CT    Appt Notes: [place] with patient's Wife Sid Tello      In 2 weeks Jose [address] CT     In 4 weeks Tomas Vallejo MD [address], [address], [place]    Appt Notes: Elevated PSA, less than 10 ng/ml      In 2 months Rosemary Mcmillan DO [address], [facility], Jamee thompson    Appt Notes: [facility] to reschedule AWV to a sooner appt:  10/11 or 10/18 mornings.   Nothing(9-17-20 last Px)                       Failed - EGFRCR or GFRAA > 50     GFR Evaluation Passed - In person appointment in the past 12 or next 3 months     Recent Outpatient Visits              8 months ago Need for shingles vaccine    [facility], [address], [place], [place]    Office Visit    1 year ago Pain in right hand    [address], [address], [place] John Paul Wei MD    Office Visit    1 year ago Screen for colon cancer    [address], [address], [place]    Nurse Only    1 year ago Chronic hand pain, right    [address], [address], [place],     Office Visit    2 years ago Persistent cough    [facility], [address], [place], [place]    Office Visit          Future Appointments           Provider Department     In 2 weeks [address] [facility] RN RADIOLOGY 3; [address] [facility] CT 7 [facility] CT    Appt Notes: [place] with patient's Wife Anirudh Garcia      In 2 weeks Jose Chavira [address] CT     In 4 weeks Isak Baldwin MD [address], [address], [place]    Appt Notes: Elevated PSA, less than 10 ng/ml      In 2 months Patricia Harper DO [address], [address], [place]    Appt Notes: [facility] to reschedule AWV to a sooner appt:  10/11 or 10/18 mornings.   Nothing(9-17-20 last Px)                       Passed - Last BP reading less than 140/90     BP Readings from Last 1 Encounters:  02/21/23 : 120/78                 Recent Outpatient Visits              8 months ago Need for shingles vaccine    [address] [facility], [place] [facility], [place]    Office Visit    1 year ago Pain in right hand    [address], [address], [place] Jaramillo MD    Office Visit    1 year ago Screen for colon cancer    [address], [address], [address]    Nurse Only    1 year ago Chronic hand pain, right    [address] [place], [place], Thania Davis DO    Office Visit    2 years ago Persistent cough    [facility], [address], [place], Thania Davis DO    Office Visit          Future Appointments           Provider Department     In 2 weeks [facility] RN RADIOLOGY 3; [facility] CT 7 [facility]    Appt Notes: [place] with patient's Wife Cali Shah      In 2 weeks Jose Ch[address]ira [address] CT     In 4 weeks Aimee Murphy MD [facility], [address], [place]    Appt Notes: Elevated PSA, less than 10 ng/ml      In 2 months Quiana Bynum DO [address], [facility], [place]    Appt Notes: [facility] to reschedule AWV to a sooner appt:  10/11 or 10/18 mornings.   Nothing(9-17-20 last Px)

## 2023-11-07 ENCOUNTER — LAB ENCOUNTER (OUTPATIENT)
Dept: LAB | Facility: REFERENCE LAB | Age: 73
End: 2023-11-07
Attending: INTERNAL MEDICINE
Payer: MEDICARE

## 2023-11-07 DIAGNOSIS — I42.9 CARDIOMYOPATHY OF UNDETERMINED TYPE (HCC): ICD-10-CM

## 2023-11-07 DIAGNOSIS — R60.0 BILATERAL LEG EDEMA: ICD-10-CM

## 2023-11-07 DIAGNOSIS — R94.31 ECG ABNORMAL: Primary | ICD-10-CM

## 2023-11-07 DIAGNOSIS — I50.20 SYSTOLIC CHF (HCC): ICD-10-CM

## 2023-11-07 DIAGNOSIS — E78.5 HLD (HYPERLIPIDEMIA): ICD-10-CM

## 2023-11-07 DIAGNOSIS — R06.00 DYSPNEA: ICD-10-CM

## 2023-11-07 LAB
ALBUMIN SERPL-MCNC: 4.3 G/DL (ref 3.2–4.8)
ALBUMIN/GLOB SERPL: 1.2 {RATIO} (ref 1–2)
ALP LIVER SERPL-CCNC: 130 U/L
ALT SERPL-CCNC: 36 U/L
ANION GAP SERPL CALC-SCNC: 9 MMOL/L (ref 0–18)
AST SERPL-CCNC: 29 U/L (ref ?–34)
BASOPHILS # BLD AUTO: 0.04 X10(3) UL (ref 0–0.2)
BASOPHILS NFR BLD AUTO: 0.6 %
BILIRUB SERPL-MCNC: 0.9 MG/DL (ref 0.2–1.1)
BUN BLD-MCNC: 13 MG/DL (ref 9–23)
BUN/CREAT SERPL: 8.1 (ref 10–20)
CALCIUM BLD-MCNC: 9.5 MG/DL (ref 8.7–10.4)
CHLORIDE SERPL-SCNC: 104 MMOL/L (ref 98–112)
CHOLEST SERPL-MCNC: 132 MG/DL (ref ?–200)
CO2 SERPL-SCNC: 29 MMOL/L (ref 21–32)
CREAT BLD-MCNC: 1.6 MG/DL
DEPRECATED RDW RBC AUTO: 47.8 FL (ref 35.1–46.3)
EGFRCR SERPLBLD CKD-EPI 2021: 46 ML/MIN/1.73M2 (ref 60–?)
EOSINOPHIL # BLD AUTO: 0.64 X10(3) UL (ref 0–0.7)
EOSINOPHIL NFR BLD AUTO: 9.3 %
ERYTHROCYTE [DISTWIDTH] IN BLOOD BY AUTOMATED COUNT: 15 % (ref 11–15)
FASTING PATIENT LIPID ANSWER: YES
FASTING STATUS PATIENT QL REPORTED: YES
GLOBULIN PLAS-MCNC: 3.7 G/DL (ref 2.8–4.4)
GLUCOSE BLD-MCNC: 135 MG/DL (ref 70–99)
HCT VFR BLD AUTO: 49.4 %
HDLC SERPL-MCNC: 43 MG/DL (ref 40–59)
HGB BLD-MCNC: 15.8 G/DL
IMM GRANULOCYTES # BLD AUTO: 0.01 X10(3) UL (ref 0–1)
IMM GRANULOCYTES NFR BLD: 0.1 %
LDLC SERPL CALC-MCNC: 67 MG/DL (ref ?–100)
LYMPHOCYTES # BLD AUTO: 2.7 X10(3) UL (ref 1–4)
LYMPHOCYTES NFR BLD AUTO: 39.4 %
MCH RBC QN AUTO: 27.9 PG (ref 26–34)
MCHC RBC AUTO-ENTMCNC: 32 G/DL (ref 31–37)
MCV RBC AUTO: 87.1 FL
MONOCYTES # BLD AUTO: 0.78 X10(3) UL (ref 0.1–1)
MONOCYTES NFR BLD AUTO: 11.4 %
NEUTROPHILS # BLD AUTO: 2.68 X10 (3) UL (ref 1.5–7.7)
NEUTROPHILS # BLD AUTO: 2.68 X10(3) UL (ref 1.5–7.7)
NEUTROPHILS NFR BLD AUTO: 39.2 %
NONHDLC SERPL-MCNC: 89 MG/DL (ref ?–130)
OSMOLALITY SERPL CALC.SUM OF ELEC: 296 MOSM/KG (ref 275–295)
PLATELET # BLD AUTO: 200 10(3)UL (ref 150–450)
POTASSIUM SERPL-SCNC: 3.8 MMOL/L (ref 3.5–5.1)
PROT SERPL-MCNC: 8 G/DL (ref 5.7–8.2)
RBC # BLD AUTO: 5.67 X10(6)UL
SODIUM SERPL-SCNC: 142 MMOL/L (ref 136–145)
TRIGL SERPL-MCNC: 121 MG/DL (ref 30–149)
VLDLC SERPL CALC-MCNC: 18 MG/DL (ref 0–30)
WBC # BLD AUTO: 6.9 X10(3) UL (ref 4–11)

## 2023-11-07 PROCEDURE — 85025 COMPLETE CBC W/AUTO DIFF WBC: CPT

## 2023-11-07 PROCEDURE — 80061 LIPID PANEL: CPT

## 2023-11-07 PROCEDURE — 80053 COMPREHEN METABOLIC PANEL: CPT

## 2023-11-07 PROCEDURE — 36415 COLL VENOUS BLD VENIPUNCTURE: CPT

## 2023-11-07 RX ORDER — METOPROLOL SUCCINATE 25 MG/1
25 TABLET, EXTENDED RELEASE ORAL 2 TIMES DAILY
Qty: 180 TABLET | Refills: 3 | Status: SHIPPED | OUTPATIENT
Start: 2023-11-07

## 2023-11-16 DIAGNOSIS — E78.5 HYPERLIPIDEMIA, UNSPECIFIED HYPERLIPIDEMIA TYPE: ICD-10-CM

## 2023-11-16 RX ORDER — ATORVASTATIN CALCIUM 80 MG/1
80 TABLET, FILM COATED ORAL DAILY
Qty: 90 TABLET | Refills: 1 | Status: SHIPPED | OUTPATIENT
Start: 2023-11-16

## 2023-11-16 NOTE — TELEPHONE ENCOUNTER
Refill passed per [place], [facility] protocol. Requested Prescriptions   Pending Prescriptions Disp Refills    ATORVASTATIN 80 MG Oral Tab [Pharmacy Med Name: ATORVASTATIN 80 MG TABLET] 90 tablet 1     Sig: TAKE 1 TABLET BY MOUTH EVERY DAY       Cholesterol Medication Protocol Passed - 11/16/2023 12:56 AM        Passed - ALT in past 12 months        Passed - LDL in past 12 months        Passed - Last ALT < 80     Lab Results   Component Value Date    ALT 36 11/07/2023             Passed - Last LDL < 130     Lab Results   Component Value Date    LDL 67 11/07/2023             Passed - In person appointment or virtual visit in the past 12 mos or appointment in next 3 mos     Recent Outpatient Visits              8 months ago Need for shingles vaccine    [address] [facility], [place], [place]    Office Visit    1 year ago Pain in right hand    [address], [address], Karthikeyan Eldridge MD    Office Visit    1 year ago Screen for colon cancer    [address], [address], [place]    Nurse Only    1 year ago Chronic hand pain, right    [address] [facility], [place],     Office Visit    2 years ago Persistent cough    [facility], [address], [place],     Office Visit          Future Appointments           Provider Department     In 1 week [address] [facility] RN RADIOLOGY 3; [address]    Appt Notes: **  [place] with patient's Wife Sid Tello      In 1 week [place]     In 2 weeks Tomas Vallejo MD [address], [address], [place]    Appt Notes: Elevated PSA, less than 10 ng/ml      In 1 month Rosemary Mcmillan DO [address] [facility], [place]    Appt Notes: [facility] to reschedule AWV to a sooner appt:  10/11 or 10/18 mornings.   Nothing(9-17-20 last Px) Future Appointments           Provider Department     In 1 week [facility] RN RADIOLOGY 3; [facility]  [facility] CT    Appt Notes: **  [place] with patient's Wife Bowen Leija      In 1 week Jose Chavira [address] CT     In 2 weeks [facility], [address], [place]    Appt Notes: Elevated PSA, less than 10 ng/ml      In 1 month Susanne Servin DO [address] [facility]    Appt Notes: [facility] to reschedule AWV to a sooner appt:  10/11 or 10/18 mornings.   Nothing(9-17-20 last Px)           Recent Outpatient Visits              8 months ago [facility] for shingles vaccine    [facility] [address], [place], [place]    Office Visit    1 year ago Pain in right hand    [address], [address], [place] Fidelia Newby MD    Office Visit    1 year ago Screen for colon cancer    [address], [address], [place]    Nurse Only    1 year ago Chronic hand pain, right    [address], [facility], [facility]    Office Visit    2 years ago Persistent cough    [facility], [address], [place], [place]    Office Visit

## 2023-11-23 DIAGNOSIS — I10 ESSENTIAL HYPERTENSION: ICD-10-CM

## 2023-11-24 ENCOUNTER — HOSPITAL ENCOUNTER (OUTPATIENT)
Dept: CT IMAGING | Facility: HOSPITAL | Age: 73
Discharge: HOME | End: 2023-11-24
Attending: INTERNAL MEDICINE
Payer: MEDICARE

## 2023-11-24 VITALS
DIASTOLIC BLOOD PRESSURE: 78 MMHG | HEIGHT: 68 IN | SYSTOLIC BLOOD PRESSURE: 138 MMHG | BODY MASS INDEX: 36.83 KG/M2 | WEIGHT: 243 LBS | HEART RATE: 76 BPM | RESPIRATION RATE: 18 BRPM

## 2023-11-24 DIAGNOSIS — R06.09 EXERTIONAL DYSPNEA: ICD-10-CM

## 2023-11-24 DIAGNOSIS — R94.39 ABNORMAL CARDIOVASCULAR STRESS TEST: ICD-10-CM

## 2023-11-24 DIAGNOSIS — R93.1 ABNORMAL FINDINGS ON DIAGNOSTIC IMAGING OF HEART AND CORONARY CIRCULATION: ICD-10-CM

## 2023-11-24 DIAGNOSIS — R94.31 ABNORMAL ECG: ICD-10-CM

## 2023-11-24 DIAGNOSIS — I42.9 CARDIOMYOPATHY, UNSPECIFIED (HCC): ICD-10-CM

## 2023-11-24 PROCEDURE — 75574 CT ANGIO HRT W/3D IMAGE: CPT | Performed by: INTERNAL MEDICINE

## 2023-11-24 RX ORDER — METOPROLOL TARTRATE 1 MG/ML
5 INJECTION, SOLUTION INTRAVENOUS SEE ADMIN INSTRUCTIONS
Status: DISCONTINUED | OUTPATIENT
Start: 2023-11-24 | End: 2023-11-26

## 2023-11-24 RX ORDER — DILTIAZEM HYDROCHLORIDE 5 MG/ML
INJECTION INTRAVENOUS
Status: COMPLETED
Start: 2023-11-24 | End: 2023-11-24

## 2023-11-24 RX ORDER — NITROGLYCERIN 0.4 MG/1
0.4 TABLET SUBLINGUAL ONCE
Status: COMPLETED | OUTPATIENT
Start: 2023-11-24 | End: 2023-11-24

## 2023-11-24 RX ORDER — METOPROLOL TARTRATE 1 MG/ML
INJECTION, SOLUTION INTRAVENOUS
Status: COMPLETED
Start: 2023-11-24 | End: 2023-11-24

## 2023-11-24 RX ORDER — HYDROCHLOROTHIAZIDE 12.5 MG/1
12.5 CAPSULE, GELATIN COATED ORAL DAILY
Qty: 90 CAPSULE | Refills: 1 | Status: SHIPPED | OUTPATIENT
Start: 2023-11-24

## 2023-11-24 RX ORDER — DILTIAZEM HYDROCHLORIDE 5 MG/ML
5 INJECTION INTRAVENOUS SEE ADMIN INSTRUCTIONS
Status: DISCONTINUED | OUTPATIENT
Start: 2023-11-24 | End: 2023-11-26

## 2023-11-24 RX ORDER — NITROGLYCERIN 0.4 MG/1
TABLET SUBLINGUAL
Status: COMPLETED
Start: 2023-11-24 | End: 2023-11-24

## 2023-11-24 RX ADMIN — DILTIAZEM HYDROCHLORIDE 5 MG: 5 INJECTION INTRAVENOUS at 11:35:00

## 2023-11-24 RX ADMIN — METOPROLOL TARTRATE 5 MG: 1 INJECTION, SOLUTION INTRAVENOUS at 11:30:00

## 2023-11-24 RX ADMIN — Medication 100 MG: at 09:28:00

## 2023-11-24 RX ADMIN — DILTIAZEM HYDROCHLORIDE 5 MG: 5 INJECTION INTRAVENOUS at 11:58:00

## 2023-11-24 RX ADMIN — METOPROLOL TARTRATE 5 MG: 1 INJECTION, SOLUTION INTRAVENOUS at 11:12:00

## 2023-11-24 RX ADMIN — METOPROLOL TARTRATE 5 MG: 1 INJECTION, SOLUTION INTRAVENOUS at 11:17:00

## 2023-11-24 RX ADMIN — METOPROLOL TARTRATE 5 MG: 5 INJECTION INTRAVENOUS at 11:12:00

## 2023-11-24 RX ADMIN — METOPROLOL TARTRATE 5 MG: 1 INJECTION, SOLUTION INTRAVENOUS at 11:24:00

## 2023-11-24 RX ADMIN — METOPROLOL TARTRATE 5 MG: 5 INJECTION INTRAVENOUS at 11:17:00

## 2023-11-24 RX ADMIN — METOPROLOL TARTRATE 5 MG: 5 INJECTION INTRAVENOUS at 11:30:00

## 2023-11-24 RX ADMIN — DILTIAZEM HYDROCHLORIDE 5 MG: 5 INJECTION INTRAVENOUS at 11:50:00

## 2023-11-24 RX ADMIN — NITROGLYCERIN 0.4 MG: 0.4 TABLET SUBLINGUAL at 12:41:00

## 2023-11-24 RX ADMIN — METOPROLOL TARTRATE 5 MG: 5 INJECTION INTRAVENOUS at 11:24:00

## 2023-11-24 RX ADMIN — Medication 100 MG: at 10:17:00

## 2023-11-24 RX ADMIN — DILTIAZEM HYDROCHLORIDE 5 MG: 5 INJECTION INTRAVENOUS at 11:40:00

## 2023-11-24 RX ADMIN — DILTIAZEM HYDROCHLORIDE 5 MG: 5 INJECTION INTRAVENOUS at 11:45:00

## 2023-11-24 NOTE — IMAGING NOTE
TO RAD HOLDING AT 0906, SPOUSE AND DAUGHTER IN RECEPTION AREA       HX TAKEN: ABNORMAL STRESS TEST      PT CONSENTED AT 0916     BASELINE VITAL SIGNS: HR 77  /78, BMI 37    CTA ORDERED BY CONCEPCIÓN ISSA MD; WAS PT GIVEN CTA PREMEDS: NO    METOPROLOL PO GIVEN 100 MILLIGRAMS  AT 0928    18 GAUGE IV STARTED AT 0938    11/7/23  GFR = 46  CREATINE = 1.60    1015 VS HR 73 /71; METOPROLOL PO GIVEN 100 MILLIGRAMS  AT 1017    1110: HR 74 AFTER BACK FROM BATHROOM, HR WAS DOWN TO 67, /81, METOPROLOL 5 MILLIGRAMS GIVEN IV PUSH AT 1112, SEE  PROTOCOL    1117: HR 69 /76, METOPROLOL 5 MILLIGRAMS GIVEN IV PUSH     1124: HR 68 /77, METOPROLOL 5 MILLIGRAMS  GIVEN IV PUSH    1129: HR 66 /72, METOPROLOL 5 MILLIGRAMS  GIVEN IV PUSH    1135: HR 68 /75, CARDIZEM 5 MILLIGRAMS  GIVEN IV PUSH     1140: HR 67 /74, CARDIZEM 5 MILLIGRAMS  GIVEN IV PUSH     1145: HR 70 /72, CARDIZEM 5 MILLIGRAMS  GIVEN IV PUSH     1150: HR 65 /69, CARDIZEM 5 MILLIGRAMS  GIVEN IV PUSH     1156 HR 60 /68, CARDIZEM 5 MILLIGRAMS  GIVEN IV PUSH     1203 HR 58 /72; CT DEPT NOT YET AVAILABLE    1217 HR 46 /61; PT DENIES LIGHTHEADEDNESS     TO CT TABLE @ 1236    CONNECT TO MONITOR,  HR 42 /66 AT 1339      NITROGLYCERIN 0.4 MILLIGRAMS SUBLINGUAL GIVEN AT 1241     CALCIUM SCORE COMPLETED AT 1245     INJECTION STARTED AT 1249, HR 43 DURING SCAN, PROCEDURE COMPLETE    POST SCAN HR 42 BP 93/62 (70) AT 1252    VS HR 47 BP 88/57 (63) STANDING AT 1254, PT ASYMPTOMATIC    TO RADIOLOGY HOLDING AREA AT 1305, HR 45 BP 94/60(67);  PT GIVEN BOTTLE OF WATER, THEN COLA FOR CAFFEINE EFFECT DUE TO HR < 50    AVS  PROVIDED.  PT STATES HE HAS A BP CUFF AT HOME, INSTRUCTED HIM TO CHECK PRIOR TO NEXT DOSE, IF HR < 60 HOLD DOSE       VS HR 56 /57(69) AT 1321    VS 42 /57 (69) AT 1321; PT UP TO BATHROOM, FEELS WELL    1338 DISCHARGED HOME, INFORMED PT'S WIFE OF LOW HR DUE TO MED EFFECT, CHECK PT'S BP & PULSE PRIOR TO NEXT DOSE METOPROLOL SUCCINATE, IF HR < 60, HOLD DOSE, THEY VERBALIZE UNDERSTANDING.

## 2023-11-24 NOTE — TELEPHONE ENCOUNTER
Please review. Protocol failed/ No protocol.     Requested Prescriptions   Pending Prescriptions Disp Refills    HYDROCHLOROTHIAZIDE 12.5 MG Oral Cap [Pharmacy Med Name: HYDROCHLOROTHIAZIDE 12.5 MG CP] 90 capsule 1     Sig: TAKE 1 CAPSULE BY MOUTH EVERY DAY       Hypertensive Medications Protocol Failed - 11/23/2023 12:30 AM        Failed - In person appointment or virtual visit in the past 6 months     Recent Outpatient Visits              9 months ago Need for shingles vaccine    [facility], [address] [place], [place]    Office Visit    1 year ago Pain in right hand    [address], [address], [place] Jose Daniel Benedict MD    Office Visit    1 year ago Screen for colon cancer    [address], [address], [place]    Nurse Only    1 year ago Chronic hand pain, right    [facility], [facility], [facility],     Office Visit    2 years ago Persistent cough    [facility], [facility], [facility],     Office Visit          Future Appointments         Provider Department Appt Notes    In 1 week Malachi Sharma MD [address], [address], [place] Elevated PSA, less than 10 ng/ml    In 1 month [facility],  [address], [address], [address] [place] [address] to reschedule AWV to a sooner appt:  11/22  Nothing(9-17-20 last Px)               Failed - EGFRCR or GFRAA > 50     GFR Evaluation  EGFRCR: 46 , resulted on 11/7/2023          Passed - In person appointment in the past 12 or next 3 months     Recent Outpatient Visits              9 months ago Need for shingles vaccine    [facility] [place], [place]    Office Visit    1 year ago Pain in right hand    [address], [address], Barry Scott MD    Office Visit    1 year ago Screen for colon cancer [facility], [address], [place]    Nurse Only    1 year ago Chronic hand pain, right    [facility], [facility], [place], [place]    Office Visit    2 years ago Persistent cough    [facility], [facility], [place], [place]    Office Visit          Future Appointments         Provider Department Appt Notes    In 1 week Aimee Murphy MD [address], [address], [place] Elevated PSA, less than 10 ng/ml    In 1 month Quiana Bynum DO [address], [facility], [facility] to reschedule AWV to a sooner appt:  11/22  Nothing(9-17-20 last Px)               Passed - Last BP reading less than 140/90     BP Readings from Last 1 Encounters:   11/24/23 138/78               Passed - CMP or BMP in past 6 months     Recent Results (from the past 4392 hour(s))   Comp Metabolic Panel (14)    Collection Time: 11/07/23 12:01 PM   Result Value Ref Range    Glucose 135 (H) 70 - 99 mg/dL    Sodium 142 136 - 145 mmol/L    Potassium 3.8 3.5 - 5.1 mmol/L    Chloride 104 98 - 112 mmol/L    CO2 29.0 21.0 - 32.0 mmol/L    Anion Gap 9 0 - 18 mmol/L    BUN 13 9 - 23 mg/dL    Creatinine 1.60 (H) 0.70 - 1.30 mg/dL    BUN/CREA Ratio 8.1 (L) 10.0 - 20.0    Calcium, Total 9.5 8.7 - 10.4 mg/dL    Calculated Osmolality 296 (H) 275 - 295 mOsm/kg    eGFR-Cr 46 (L) >=60 mL/min/1.73m2    ALT 36 10 - 49 U/L    AST 29 <=34 U/L    Alkaline Phosphatase 130 (H) 45 - 117 U/L    Bilirubin, Total 0.9 0.2 - 1.1 mg/dL    Total Protein 8.0 5.7 - 8.2 g/dL    Albumin 4.3 3.2 - 4.8 g/dL    Globulin  3.7 2.8 - 4.4 g/dL    A/G Ratio 1.2 1.0 - 2.0    Patient Fasting for CMP? Yes      *Note: Due to a large number of results and/or encounters for the requested time period, some results have not been displayed. A complete set of results can be found in Results Review.

## 2023-12-04 ENCOUNTER — OFFICE VISIT (OUTPATIENT)
Dept: SURGERY | Facility: CLINIC | Age: 73
End: 2023-12-04
Payer: MEDICARE

## 2023-12-04 DIAGNOSIS — R97.20 ELEVATED PSA: Primary | ICD-10-CM

## 2023-12-04 PROCEDURE — 1126F AMNT PAIN NOTED NONE PRSNT: CPT | Performed by: UROLOGY

## 2023-12-04 PROCEDURE — 99204 OFFICE O/P NEW MOD 45 MIN: CPT | Performed by: UROLOGY

## 2023-12-04 RX ORDER — TAMSULOSIN HYDROCHLORIDE 0.4 MG/1
0.4 CAPSULE ORAL DAILY
Qty: 90 CAPSULE | Refills: 3 | Status: SHIPPED | OUTPATIENT
Start: 2023-12-04 | End: 2024-11-28

## 2023-12-04 NOTE — PROGRESS NOTES
SUBJECTIVE:  Belkys Zazueta is a 72year old male who presents for a consultation at the request of, and a copy of this note will be sent to, Dr. Lenny Hernandez, for evaluation of  elevated PSA. He states that the problem is unchanged. Symptoms include noted to have an elevated PSA. He denies any other complaints. He does report some lower urinary tract symptoms including weak stream urgency. Has an IPSS score of 9 quality-of-life index of 4. He is not aware of any family history of prostate cancer. No dysuria or gross hematuria.    PSA PSA Screen   Latest Ref Rng 0.0 - 4.0 ng/mL <=4.00 ng/mL   2/6/2017 2.7     9/17/2020  3.55    10/11/2023  4.97 (H)       Legend:  (H) High  Allergies: No Known Allergies    History:  Past Medical History:   Diagnosis Date    Arrhythmia     R BBB    Colon polyp     Congestive heart disease ([address]     Gall stones 2014    Gastric polyp     Gastritis     High blood pressure     High cholesterol     Hyperlipidemia     Obesity 2/43/4994    Systolic CHF, chronic ([address] 6/15/2017    Ulcer of the stomach and intestine       Past Surgical History:   Procedure Laterality Date    CHOLECYSTECTOMY  11/12/14    COLONOSCOPY SCREENING - REFERRAL N/A 10/18/2022    Procedure: COLONOSCOPY-SCREENING;  Surgeon: Veronica Salvador MD;  Location: [address] [place] [address] ENDOSCOPY      Family History   Problem Relation Age of Onset    Diabetes Mother     Diabetes Brother       Social History:   Social History     Socioeconomic History    Marital status:    Occupational History    Occupation:    Tobacco Use    Smoking status: Former     Packs/day: 0.40     Years: 20.00     Additional pack years: 0.00     Total pack years: 8.00     Types: Cigarettes    Smokeless tobacco: Never   Vaping Use    Vaping Use: Never used   Substance and Sexual Activity    Alcohol use: No    Drug use: No            REVIEW OF SYSTEMS:  RESPIRATORY:  Negative for chest tightness, wheezing, cough, shortness of breath, sputum production,chest pain or pleurisy. CARDIOVASCULAR:  Negative for pain or chest discomfort, dizziness or fainting, palpitations, leg swelling, nocturia, or claudication. GASTROINTESTINAL:  Negative for nausea, vomiting, diarrhea, constipation, heartburn or indigestion, abdominal pains, bloody or tarry stools. GENERAL: Denies:  weight gain, weight loss, fever, night sweats, bone pain, malaise, and fatigue. Positive for:  none. All other ROS reviewed and otherwise normal.    OBJECTIVE:  There were no vitals taken for this visit. He appears well, in no apparent distress. Alert and oriented times three, pleasant and cooperative. CARDIOVASCULAR:normal apical impulse  RESPIRATORY: no chest wall deformities or tenderness  ABDOMEN: abdomen is soft without significant tenderness, masses, organomegaly or guarding  GENITOURINARY:      Penis: no penile lesions or discharge. Meatus normal location and size. Scrotum: normal in appearance      Right Epididymis and Vas: both are palpably normal.      Right Testis: normal        Left Epididymis and Vas: both are palpably normal.      Left Testis: normal        Inguinal Lymph Nodes: non-palpable both      Prostate: prostate smooth and symmetric without tenderness or nodules, enlarged, 50 grams       Rectal: normal tone, no masses  Skin exam grossly normal  Intact neurologically grossly    ASSESSMENT/PLAN  Encounter Diagnosis   Name Primary? Elevated PSA Yes       No orders of the defined types were placed in this encounter. Reviewed findings at length with patient. Recommended:  - BPH, lower urinary tract symptoms: Unable to void today. Will obtain urinalysis at next visit. Will start him empirically on tamsulosin 0.4 mg daily. Side effects reviewed. - Elevated serum PSA: Follow-up in 3 months with a repeat PSA 1 to 2 days prior to the visit.   If it remains increased more so than previous in October consider MRI with or without biopsy depending on results.     Meds This Visit:  Requested Prescriptions      No prescriptions requested or ordered in this encounter       Imaging & Referrals:  None

## 2024-01-09 ENCOUNTER — OFFICE VISIT (OUTPATIENT)
Dept: FAMILY MEDICINE CLINIC | Facility: CLINIC | Age: 74
End: 2024-01-09
Payer: MEDICARE

## 2024-01-09 VITALS
BODY MASS INDEX: 38.04 KG/M2 | SYSTOLIC BLOOD PRESSURE: 112 MMHG | RESPIRATION RATE: 18 BRPM | DIASTOLIC BLOOD PRESSURE: 72 MMHG | WEIGHT: 251 LBS | HEIGHT: 68 IN | HEART RATE: 69 BPM | OXYGEN SATURATION: 98 %

## 2024-01-09 DIAGNOSIS — N28.9 RENAL INSUFFICIENCY: ICD-10-CM

## 2024-01-09 DIAGNOSIS — Z00.00 MEDICARE ANNUAL WELLNESS VISIT, SUBSEQUENT: Primary | ICD-10-CM

## 2024-01-09 DIAGNOSIS — Z28.21 HERPES ZOSTER VACCINATION DECLINED: ICD-10-CM

## 2024-01-09 DIAGNOSIS — I10 ESSENTIAL HYPERTENSION: ICD-10-CM

## 2024-01-09 DIAGNOSIS — I50.22 CHRONIC SYSTOLIC CONGESTIVE HEART FAILURE (HCC): ICD-10-CM

## 2024-01-09 DIAGNOSIS — I42.9 CARDIOMYOPATHY, UNSPECIFIED TYPE (HCC): ICD-10-CM

## 2024-01-09 DIAGNOSIS — Z28.21 PNEUMOCOCCAL VACCINATION DECLINED BY PATIENT: ICD-10-CM

## 2024-01-09 DIAGNOSIS — Z12.5 ENCOUNTER FOR PROSTATE CANCER SCREENING: ICD-10-CM

## 2024-01-09 PROCEDURE — G0439 PPPS, SUBSEQ VISIT: HCPCS | Performed by: FAMILY MEDICINE

## 2024-01-09 PROCEDURE — 1126F AMNT PAIN NOTED NONE PRSNT: CPT | Performed by: FAMILY MEDICINE

## 2024-01-09 NOTE — PATIENT INSTRUCTIONS
All adult screening ordered and done appropriate for patient's age and gender and risk factors and complaints.  Medication reviewed and renewed where needed and appropriate.  Comply with medications.  Monitor blood pressures and record at home. Limit salt intake.  Recommend weight loss via daily exercising and consistent healthy dietary changes.

## 2024-01-09 NOTE — PROGRESS NOTES
Subjective:     Patient ID: Girish Naranjo is a 73 year old male.    This patient has a 73-year-old hypertensive/cardiomyopathy/chronic CHF, but currently compensated -American gentleman who is here for Medicare annual visit which will also satisfy all the requirements for a complete preventive care physical and for status update on any confirmed chronic medical illnesses and follow up on any previous labs or procedures that were suggestive or in need of further work up. Colonoscopy is current. Bowel and bladder functions are intact.    Patient denies headaches, chest pain, dizziness, shortness of breath, visual changes, and/or exertional fatigue.    Patient declines on pneumococcal and shingles vaccine.          History/Other:   Review of Systems  Current Outpatient Medications   Medication Sig Dispense Refill    tamsulosin 0.4 MG Oral Cap Take 1 capsule (0.4 mg total) by mouth daily. Take 1/2 hour following the same meal each day 90 capsule 3    hydroCHLOROthiazide 12.5 MG Oral Cap Take 1 capsule (12.5 mg total) by mouth daily. 90 capsule 1    atorvastatin 80 MG Oral Tab Take 1 tablet (80 mg total) by mouth daily. 90 tablet 1    metoprolol succinate ER 25 MG Oral Tablet 24 Hr Take 1 tablet (25 mg total) by mouth 2 (two) times daily. 180 tablet 3    valsartan 40 MG Oral Tab Take 2 tablets (80 mg total) by mouth daily. 180 tablet 1    allopurinol 300 MG Oral Tab Take 1 tablet (300 mg total) by mouth daily. 90 tablet 3    LEVOCETIRIZINE 5 MG Oral Tab TAKE 1 TABLET BY MOUTH EVERY DAY IN THE EVENING 90 tablet 3    ASPIRIN LOW DOSE 81 MG Oral Tab EC TAKE 1 TABLET BY MOUTH EVERY DAY 90 tablet 1     Allergies:No Known Allergies    Past Medical History:   Diagnosis Date    Arrhythmia     R BBB    Colon polyp     Congestive heart disease (HCC)     Gall stones 2014    Gastric polyp     Gastritis     High blood pressure     High cholesterol     Hyperlipidemia     Obesity 6/15/2017    Systolic CHF, chronic (HCC)  6/15/2017    Ulcer of the stomach and intestine       Past Surgical History:   Procedure Laterality Date    CHOLECYSTECTOMY  11/12/14    COLONOSCOPY SCREENING - REFERRAL N/A 10/18/2022    Procedure: COLONOSCOPY-SCREENING;  Surgeon: Jagdish Dunbar MD;  Location: [facility] ENDOSCOPY      Family History   Problem Relation Age of Onset    Diabetes Mother     Diabetes Brother       Social History:   Social History     Socioeconomic History    Marital status:    Occupational History    Occupation:    Tobacco Use    Smoking status: Former     Packs/day: 0.40     Years: 20.00     Additional pack years: 0.00     Total pack years: 8.00     Types: Cigarettes    Smokeless tobacco: Never   Vaping Use    Vaping Use: Never used   Substance and Sexual Activity    Alcohol use: No    Drug use: No        Girish Naranjo's SCREENING SCHEDULE   Tests on this list are recommended by your physician but may not be covered, or covered at this frequency, by your insurer. Please check with your insurance carrier before scheduling to verify coverage.    PREVENTATIVE SERVICES  INDICATIONS AND SCHEDULE Internal Lab or Procedure External Lab or Procedure   Diabetes Screening      HbgA1C   Annually HgbA1C (%)   Date Value   09/17/2020 6.3 (H)         No data to display                Fasting Blood Sugar (FSB) Annually Glucose (mg/dL)   Date Value   11/07/2023 135 (H)       Cardiovascular Disease Screening     LDL Annually LDL Cholesterol (mg/dL)   Date Value   11/07/2023 67        EKG One Time      Colorectal Cancer Screening      Colonoscopy Screen every 10 years Health Maintenance   Topic Date Due    Colorectal Cancer Screening  10/18/2027    Update Health Maintenance if applicable    Flex Sigmoidoscopy Screen every 5 years No results found for this or any previous visit.      No data to display                 Fecal Occult Blood Annually No results found for: \"FOB\", \"OCCULTSTOOL\"      No data to display                 Glaucoma Screening      Ophthalmology Visit Annually      Prostate Cancer Screening      PSA  Annually Health Maintenance   Topic Date Due    PSA  10/11/2025     Update Health Maintenance if applicable   Immunizations      Influenza No orders found for this or any previous visit. Update Immunization Activity if applicable    Pneumococcal No orders found for this or any previous visit. Update Immunization Activity if applicable    Hepatitis B No orders found for this or any previous visit. Update Immunization Activity if applicable    Tetanus No orders found for this or any previous visit. Update Immunization Activity if applicable    Zoster (Not covered by Medicare Part B) No orders found for this or any previous visit. Update Immunization Activity if applicable     SPECIFIC DISEASE MONITORING Internal Lab or Procedure External Lab or Procedure   Annual Monitoring of Persistent     Medications (ACE/ARB, digoxin, diuretics)    Potassium  Annually Potassium (mmol/L)   Date Value   11/07/2023 3.8     POTASSIUM (P) (mmol/L)   Date Value   10/06/2014 4.6         No data to display                Creatinine  Annually Creatinine (mg/dL)   Date Value   11/07/2023 1.60 (H)         No data to display                Digoxin Serum Conc  Annually No results found for: \"DIGOXIN\"      No data to display                Diabetes      HgbA1C  Annually HgbA1C (%)   Date Value   09/17/2020 6.3 (H)         No data to display                Creat/alb ratio  Annually      LDL  Annually LDL Cholesterol (mg/dL)   Date Value   11/07/2023 67         No data to display                 Dilated Eye exam  Annually      No data to display                   No data to display                COPD      Spirometry Testing Annually No results found for this or any previous visit.      No data to display                    General Health                                                   Functional Ability                                                         Functional Status                                     Fall/Risk Assessment                                                              Depression Screening (PHQ-2/PHQ-9): Over the LAST 2 WEEKS                      Advance Directives                Hearing Assessment (Required for AWV/SWV)      Hearing Screening    Screening Method: Questionnaire  I have a problem hearing over the telephone: Sometimes I have trouble following the conversations when two or more people are talking at the same time: No   I have trouble understanding things on the TV: No I have to strain to understand conversations: No   I have to worry about missing the telephone ring or doorbell: No I have trouble hearing conversations in a noisy background such as a crowded room or restaurant: No   I get confused about where sounds come from: No I misunderstand some words in a sentence and need to ask people to repeat themselves: No   I especially have trouble understanding the speech of women and children: No I have trouble understanding the speaker in a large room such as at a meeting or place of Restorationism: No   Many people I talk to seem to mumble (or don't speak clearly): No People get annoyed because I misunderstand what they say: No   I misunderstand what others are saying and make inappropriate responses: No    Family members and friends have told me they think I may have hearing loss: No             Visual Acuity                   Cognitive Assessment     What day of the week is this?: Correct    What month is it?: Correct    What year is it?: Correct    Recall \"Ball\": Correct    Recall \"Flag\": Correct    Recall \"Tree\": Correct          Objective:   Vitals:    01/09/24 1435   BP: 112/72   Pulse:    Resp:        Physical Exam  Constitutional:       General: He is not in acute distress.     Appearance: He is not ill-appearing.   HENT:      Head: Normocephalic.      Right Ear: Tympanic membrane normal.      Left Ear: Tympanic membrane  normal.      Nose: Nose normal.      Mouth/Throat:      Mouth: Mucous membranes are moist.   Cardiovascular:      Rate and Rhythm: Regular rhythm.      Heart sounds: Murmur heard.      No gallop.   Pulmonary:      Breath sounds: Normal breath sounds.   Abdominal:      General: Bowel sounds are normal. There is no distension.      Palpations: Abdomen is soft. There is no mass.      Comments: No pulsatile mass or bruits.   Neurological:      Mental Status: He is alert and oriented to person, place, and time.   Psychiatric:         Mood and Affect: Mood normal.         Assessment & Plan:   1. Medicare annual wellness visit, subsequent  Survey completed.  All recent labs including those that crossover from specialty care have been reviewed.    2. Chronic systolic congestive heart failure (HCC)  Currently compensated.    3. Cardiomyopathy, unspecified type (HCC)  Currently asymptomatic.    4. Essential hypertension  Measures to goal.    5. Encounter for prostate cancer screening  Status to be updated.    6. Herpes zoster vaccination declined  Declined by patient.    7. Pneumococcal vaccination declined by patient  Declined by patient.    8. Renal insufficiency  Status is stable.  GFR at 46.      No orders of the defined types were placed in this encounter.      Meds This Visit:  Requested Prescriptions      No prescriptions requested or ordered in this encounter       Imaging & Referrals:  None     Patient Instructions   All adult screening ordered and done appropriate for patient's age and gender and risk factors and complaints.  Medication reviewed and renewed where needed and appropriate.  Comply with medications.  Monitor blood pressures and record at home. Limit salt intake.  Recommend weight loss via daily exercising and consistent healthy dietary changes.    Return in about 1 year (around 1/9/2025), or if symptoms worsen or fail to improve.

## 2024-02-21 DIAGNOSIS — I10 ESSENTIAL HYPERTENSION: ICD-10-CM

## 2024-02-22 RX ORDER — VALSARTAN 40 MG/1
80 TABLET ORAL DAILY
Qty: 180 TABLET | Refills: 3 | Status: SHIPPED | OUTPATIENT
Start: 2024-02-22

## 2024-02-22 NOTE — TELEPHONE ENCOUNTER
GFR out of range for protocol.  Please advise on refill request.    Requested Prescriptions     Pending Prescriptions Disp Refills    VALSARTAN 40 MG Oral Tab [Pharmacy Med Name: VALSARTAN 40 MG TABLET] 180 tablet 1     Sig: TAKE 2 TABLETS BY MOUTH EVERY DAY      Recent Visits  Date Type Provider Dept   01/09/24 Office Visit Henrry Anderson DO [facility]   02/21/23 Office Visit Henrry Anderson DO [facility]   Showing recent visits within past 540 days with a meds authorizing provider and meeting all other requirements  Future Appointments  No visits were found meeting these conditions.  Showing future appointments within next 150 days with a meds authorizing provider and meeting all other requirements     Requested Prescriptions   Pending Prescriptions Disp Refills    VALSARTAN 40 MG Oral Tab [Pharmacy Med Name: VALSARTAN 40 MG TABLET] 180 tablet 1     Sig: TAKE 2 TABLETS BY MOUTH EVERY DAY       Hypertension Medications Protocol Failed - 2/21/2024 12:21 AM        Failed - EGFRCR or GFRAA > 50     GFR Evaluation  EGFRCR: 46 , resulted on 11/7/2023          Passed - CMP or BMP in past 12 months        Passed - Last BP reading less than 140/90     BP Readings from Last 1 Encounters:   01/09/24 112/72               Passed - In person appointment or virtual visit in the past 12 mos or appointment in next 3 mos     Recent Outpatient Visits              1 month ago Medicare annual wellness visit, subsequent    [facility] Henrry Anderson DO    Office Visit    2 months ago [facility]    [facility] Visit    1 year ago Need for shingles vaccine    [facility] Henrry Anderson DO    Office Visit    1 year ago [facility] in right hand    [facility]    [facility]    1 year ago  Screen for colon cancer    [facility]    Nurse Only          Future Appointments         Provider Department Appt Notes    In 1 week Sohail Araujo MD [facility] 3 month                   Future Appointments         Provider Department Appt Notes    In 1 week [facility]           Recent Outpatient Visits              1 month ago Medicare annual wellness visit, subsequent    [facility] Henrry Anderson DO    Office Visit    2 months ago Elevated PSA    [facility] Sohail Araujo MD    Office Visit    1 year ago Need for shingles vaccine    [facility]    Office Visit    1 year ago Pain in right hand    [facility] Rojelio Orona MD    Office Visit    1 year ago Screen for colon cancer    [facility]    Nurse Only

## 2024-03-05 ENCOUNTER — LAB ENCOUNTER (OUTPATIENT)
Dept: LAB | Facility: HOSPITAL | Age: 74
End: 2024-03-05
Attending: UROLOGY
Payer: MEDICARE

## 2024-03-05 ENCOUNTER — OFFICE VISIT (OUTPATIENT)
Dept: SURGERY | Facility: CLINIC | Age: 74
End: 2024-03-05
Payer: MEDICARE

## 2024-03-05 DIAGNOSIS — R97.20 ELEVATED PSA: Primary | ICD-10-CM

## 2024-03-05 DIAGNOSIS — R97.20 ELEVATED PSA: ICD-10-CM

## 2024-03-05 LAB — PSA SERPL-MCNC: 4.07 NG/ML (ref ?–4)

## 2024-03-05 PROCEDURE — 84153 ASSAY OF PSA TOTAL: CPT

## 2024-03-05 PROCEDURE — 36415 COLL VENOUS BLD VENIPUNCTURE: CPT

## 2024-03-05 PROCEDURE — 99213 OFFICE O/P EST LOW 20 MIN: CPT | Performed by: UROLOGY

## 2024-03-05 NOTE — PROGRESS NOTES
Girish Naranjo is a 74 year old male.    HPI:     Chief Complaint   Patient presents with    Follow - Up     PT here for f/u visit. PT states he has been feeling really good on the flomax. PT states he is no longer getting up at night.        74-year-old male in follow-up to visit December 4, 2023 for elevated PSA of 4.97 October 2023, BPH and lower urinary tract symptoms for which I started him at his last visit on tamsulosin 0.4 mg daily.  He states he has noticed a 50 to 60% improvement in symptoms.  IPSS score at last visit with 9.  IPSS score today is 6 quality-of-life index is 2.  He denies any medication side effects.  Plan had been to the patient to repeat his PSA today but he has not obtained and will do so after the visit.      HISTORY:  Past Medical History:   Diagnosis Date    Arrhythmia     R BBB    Colon polyp     Congestive heart disease (HCC)     Gall stones 2014    Gastric polyp     Gastritis     High blood pressure     High cholesterol     Hyperlipidemia     Obesity 6/15/2017    Systolic CHF, chronic (HCC) 6/15/2017    Ulcer of the stomach and intestine       Past Surgical History:   Procedure Laterality Date    CHOLECYSTECTOMY  11/12/14    COLONOSCOPY SCREENING - REFERRAL N/A 10/18/2022    Procedure: COLONOSCOPY-SCREENING;  Surgeon: Jagdish Dunbar MD;  Location: [facility] ENDOSCOPY      Family History   Problem Relation Age of Onset    Diabetes Mother     Diabetes Brother       Social History:   Social History     Socioeconomic History    Marital status:    Occupational History    Occupation:    Tobacco Use    Smoking status: Former     Packs/day: 0.40     Years: 20.00     Additional pack years: 0.00     Total pack years: 8.00     Types: Cigarettes    Smokeless tobacco: Never   Vaping Use    Vaping Use: Never used   Substance and Sexual Activity    Alcohol use: No    Drug use: No        Medications (Active prior to today's visit):  Current Outpatient Medications    Medication Sig Dispense Refill    valsartan 40 MG Oral Tab Take 2 tablets (80 mg total) by mouth daily. 180 tablet 3    tamsulosin 0.4 MG Oral Cap Take 1 capsule (0.4 mg total) by mouth daily. Take 1/2 hour following the same meal each day 90 capsule 3    hydroCHLOROthiazide 12.5 MG Oral Cap Take 1 capsule (12.5 mg total) by mouth daily. 90 capsule 1    atorvastatin 80 MG Oral Tab Take 1 tablet (80 mg total) by mouth daily. 90 tablet 1    metoprolol succinate ER 25 MG Oral Tablet 24 Hr Take 1 tablet (25 mg total) by mouth 2 (two) times daily. 180 tablet 3    allopurinol 300 MG Oral Tab Take 1 tablet (300 mg total) by mouth daily. 90 tablet 3    LEVOCETIRIZINE 5 MG Oral Tab TAKE 1 TABLET BY MOUTH EVERY DAY IN THE EVENING 90 tablet 3    ASPIRIN LOW DOSE 81 MG Oral Tab EC TAKE 1 TABLET BY MOUTH EVERY DAY 90 tablet 1       Allergies:  No Known Allergies      ROS:       PHYSICAL EXAM:        ASSESSMENT/PLAN:   Assessment   Encounter Diagnosis   Name Primary?    Elevated PSA Yes       Recommend:  - Continue on tamsulosin 0.4 mg daily.  - Repeat PSA to be done today.  If improved, follow-up again in 6 months with repeat PSA at that time.  PSA continues to be abnormally elevated, office will contact the patient regarding proceeding with an MRI of the prostate.         Orders This Visit:  No orders of the defined types were placed in this encounter.      Meds This Visit:  Requested Prescriptions      No prescriptions requested or ordered in this encounter       Imaging & Referrals:  None     3/5/2024  Sohail Araujo MD

## 2024-03-08 DIAGNOSIS — R05.8 ALLERGIC COUGH: ICD-10-CM

## 2024-03-08 DIAGNOSIS — J30.9 ALLERGIC RHINITIS, UNSPECIFIED SEASONALITY, UNSPECIFIED TRIGGER: ICD-10-CM

## 2024-03-08 RX ORDER — LEVOCETIRIZINE DIHYDROCHLORIDE 5 MG/1
5 TABLET, FILM COATED ORAL EVERY EVENING
Qty: 90 TABLET | Refills: 3 | Status: SHIPPED | OUTPATIENT
Start: 2024-03-08

## 2024-03-08 NOTE — TELEPHONE ENCOUNTER
Refill passed per protocol.    Requested Prescriptions   Pending Prescriptions Disp Refills    LEVOCETIRIZINE 5 MG Oral Tab [Pharmacy Med Name: LEVOCETIRIZINE 5 MG TABLET] 90 tablet 3     Sig: TAKE 1 TABLET BY MOUTH EVERY DAY IN THE EVENING       Allergy Medication Protocol Passed - 3/8/2024 12:22 AM        Passed - In person appointment or virtual visit in the past 12 mos or appointment in next 3 mos     Recent Outpatient Visits              3 days ago Elevated PSA    [facility]    Office Visit    1 month ago Medicare annual wellness visit, subsequent    [facility] Henrry Ramos,     Office Visit    3 months ago [facility]    [facility] Visit    1 year ago Need for shingles vaccine    [facility] Henrry Ramos,     Office Visit    1 year ago Pain in right hand    [facility]    [facility]                             Recent Outpatient Visits              3 days ago Elevated PSA    [facility]    Office Visit    1 month ago Medicare annual wellness visit, subsequent    [facility]    Office Visit    3 months ago Elevated PSA    [facility]    Office Visit    1 year ago Need for shingles vaccine    [facility] Henrry Ramos DO    Office Visit    1 year ago Pain in right hand    [facility]    [facility]

## 2024-04-22 NOTE — TELEPHONE ENCOUNTER
Please advise in regards to refill request. Thank You    Refill Protocol Appointment Criteria  · Appointment scheduled in the past 6 months or in the next 3 months  Recent Outpatient Visits            3 months ago Finger pain, left    [facility]izar
ambulatory

## 2024-05-10 DIAGNOSIS — E78.5 HYPERLIPIDEMIA, UNSPECIFIED HYPERLIPIDEMIA TYPE: ICD-10-CM

## 2024-05-10 RX ORDER — ATORVASTATIN CALCIUM 80 MG/1
80 TABLET, FILM COATED ORAL DAILY
Qty: 90 TABLET | Refills: 3 | Status: SHIPPED | OUTPATIENT
Start: 2024-05-10

## 2024-05-10 NOTE — TELEPHONE ENCOUNTER
Refill passed per protocol.    Requested Prescriptions   Pending Prescriptions Disp Refills    ATORVASTATIN 80 MG Oral Tab [Pharmacy Med Name: ATORVASTATIN 80 MG TABLET] 90 tablet 1     Sig: TAKE 1 TABLET BY MOUTH EVERY DAY       Cholesterol Medication Protocol Passed - 5/10/2024 12:31 AM        Passed - ALT < 80     Lab Results   Component Value Date    ALT 36 11/07/2023             Passed - ALT resulted within past year        Passed - Lipid panel within past 12 months     Lab Results   Component Value Date    CHOLEST 132 11/07/2023    TRIG 121 11/07/2023    HDL 43 11/07/2023    LDL 67 11/07/2023    VLDL 18 11/07/2023    NONHDLC 89 11/07/2023             Passed - In person appointment or virtual visit in the past 12 mos or appointment in next 3 mos     Recent Outpatient Visits              2 months ago [facility]    [facility] Visit    4 months ago Medicare annual wellness visit, subsequent    [facility] Henrry Anderson DO    Office Visit    5 months ago Elevated PSA    [facility]    Office Visit    1 year ago Need for shingles vaccine    [facility] Henrry Anderson DO    Office Visit    1 year ago Pain in right hand    [facility]    [facility]          Future Appointments         Provider Department Appt Notes    In 1 month [facility] MRI 2 (3T WIDE) [facility] MRI Scheduled w/ pt's wife.                         Future Appointments         Provider Department Appt Notes    In 1 month [facility] MRI RM2 (3T WIDE) [facility] MRI Scheduled w/ pt's wife.          Recent Outpatient Visits              2 months ago Elevated PSA    [facility]    [facility] Visit    4  months ago Medicare annual wellness visit, subsequent    [facility] Henrry Anderson, DO    Office Visit    5 months ago Elevated PSA    [facility]    [facility] Visit    1 year ago Need for shingles vaccine    [facility] Henrry Anderson, DO    Office Visit    1 year ago Pain in right hand    [facility], Rojelio Up MD    Office Visit

## 2024-05-17 DIAGNOSIS — I10 ESSENTIAL HYPERTENSION: ICD-10-CM

## 2024-05-20 RX ORDER — HYDROCHLOROTHIAZIDE 12.5 MG/1
12.5 CAPSULE, GELATIN COATED ORAL DAILY
Qty: 90 CAPSULE | Refills: 3 | Status: SHIPPED | OUTPATIENT
Start: 2024-05-20

## 2024-05-20 NOTE — TELEPHONE ENCOUNTER
Please Review. Protocol Failed; No Protocol     EGFRCR: 46 , resulted on 11/7/2023   Recent Visits  Date Type Provider Dept   01/09/24 Office Visit Henrry Anderson DO [facility]       Requested Prescriptions   Pending Prescriptions Disp Refills    HYDROCHLOROTHIAZIDE 12.5 MG Oral Cap [Pharmacy Med Name: HYDROCHLOROTHIAZIDE 12.5 MG CP] 90 capsule 1     Sig: TAKE 1 CAPSULE BY MOUTH EVERY DAY       Hypertension Medications Protocol Failed - 5/17/2024 12:25 AM        Failed - EGFRCR or GFRAA > 50     GFR Evaluation  EGFRCR: 46 , resulted on 11/7/2023          Passed - CMP or BMP in past 12 months        Passed - Last BP reading less than 140/90     BP Readings from Last 1 Encounters:   01/09/24 112/72               Passed - In person appointment or virtual visit in the past 12 mos or appointment in next 3 mos     Recent Outpatient Visits              2 months ago Elevated PSA    [facility]    Office Visit    4 months ago Medicare annual wellness visit, subsequent    [facility] Henrry Anderson DO    Office Visit    5 months ago [facility]    [facility]    1 year ago Need for shingles vaccine    [facility] Henrry Anderson DO    Office Visit    2 years ago Pain in right hand    [facility]          Future Appointments         Provider Department Appt Notes    In 3 weeks [facility] MRI 2 (3T WIDE) [facility] MRI Scheduled w/ pt's wife.                           Future Appointments         Provider Department Appt Notes    In 3 weeks [facility] MRI RM2 (3T WIDE) [facility] MRI Scheduled w/ pt's wife.          Recent Outpatient Visits              2 months ago Elevated PSA    [facility]    Office Visit    4 months ago Medicare annual wellness visit, subsequent    [facility] Henrry Anderson,     Office Visit    5 months ago [facility]vated PSA    [facility]    Office Visit    1 year ago Need for shingles vaccine    [facility] Henrry Anderson,     Office Visit    2 years ago Pain in right hand    [facility]    [facility]

## 2024-06-12 ENCOUNTER — HOSPITAL ENCOUNTER (OUTPATIENT)
Dept: MRI IMAGING | Facility: HOSPITAL | Age: 74
Discharge: HOME | End: 2024-06-12
Attending: PHYSICIAN ASSISTANT
Payer: MEDICARE

## 2024-06-12 DIAGNOSIS — R97.20 ELEVATED PSA: ICD-10-CM

## 2024-06-12 PROCEDURE — A9575 INJ GADOTERATE MEGLUMI 0.1ML: HCPCS | Performed by: PHYSICIAN ASSISTANT

## 2024-06-12 PROCEDURE — 72197 MRI PELVIS W/O & W/DYE: CPT | Performed by: PHYSICIAN ASSISTANT

## 2024-06-12 RX ORDER — GADOTERATE MEGLUMINE 376.9 MG/ML
20 INJECTION INTRAVENOUS
Status: COMPLETED | OUTPATIENT
Start: 2024-06-12 | End: 2024-06-12

## 2024-06-12 RX ADMIN — GADOTERATE MEGLUMINE 20 ML: 376.9 INJECTION INTRAVENOUS at 17:18:00

## 2024-06-17 ENCOUNTER — TELEPHONE (OUTPATIENT)
Dept: SURGERY | Facility: CLINIC | Age: 74
End: 2024-06-17

## 2024-06-17 RX ORDER — CEFDINIR 300 MG/1
CAPSULE ORAL
Qty: 6 CAPSULE | Refills: 0 | Status: SHIPPED | OUTPATIENT
Start: 2024-06-17

## 2024-06-17 RX ORDER — CIPROFLOXACIN 500 MG/1
TABLET, FILM COATED ORAL
Qty: 6 TABLET | Refills: 0 | Status: SHIPPED | OUTPATIENT
Start: 2024-06-17

## 2024-06-17 NOTE — TELEPHONE ENCOUNTER
----- Message from Marge Qiu sent at 6/13/2024  2:35 PM CDT -----  MRI abnormal, recommend MRI fusion prostate biopsy.

## 2024-06-17 NOTE — TELEPHONE ENCOUNTER
Called patient to schedule uro july fusion prostate biopsy, patient wants to wait until he' back from out of town.    Patient will  rx,     Please transfer call to [phone].     I will close this encounter for now.

## 2024-06-17 NOTE — TELEPHONE ENCOUNTER
I s/w pt and informed him of the results and instructions in JORGE L msg as stated below and he verbalized understanding and agreed to proceed with schdg the UroNav procedure. I explained what to expect and some of the instructions for the prep and holding asa products and I told him that I will send the abx to his [facility] pharmacy in [place] and I suggested that he pick them up before he goes OOT and I will send this msg to the surgery schdr. Cuellar and let her know that he will be OOT for about 2 months and will not want to schd the procedure till Sept.

## 2024-07-11 NOTE — TELEPHONE ENCOUNTER
Please review; protocol failed/ has no protocol    Requested Prescriptions   Pending Prescriptions Disp Refills    ALLOPURINOL 300 MG Oral Tab [Pharmacy Med Name: ALLOPURINOL 300 MG TABLET] 90 tablet 3     Sig: TAKE 1 TABLET BY MOUTH EVERY DAY       There is no refill protocol information for this order        Recent Outpatient Visits              [facility] months [facility]    [facility]    6 months ago Medicare annual wellness visit, subsequent    [facility] Henrry Ramos DO    Office Visit    7 months ago [facility]    [facility]    1 year ago Need for shingles vaccine    [facility] Ramos [facility]    Office Visit    2 years ago Pain in right hand    [facility]    [facility]          Future Appointments         Provider Department Appt Notes    In 2 months [facility].

## 2024-07-12 RX ORDER — ALLOPURINOL 300 MG/1
300 TABLET ORAL DAILY
Qty: 90 TABLET | Refills: 3 | Status: SHIPPED | OUTPATIENT
Start: 2024-07-12

## 2024-07-15 RX ORDER — ALLOPURINOL 300 MG/1
300 TABLET ORAL DAILY
Qty: 90 TABLET | Refills: 3 | OUTPATIENT
Start: 2024-07-15

## 2024-09-03 RX ORDER — TAMSULOSIN HYDROCHLORIDE 0.4 MG/1
0.4 CAPSULE ORAL DAILY
Qty: 90 CAPSULE | Refills: 3 | Status: SHIPPED | OUTPATIENT
Start: 2024-09-03

## 2024-09-03 NOTE — TELEPHONE ENCOUNTER
Refill for tamsulosin sent by protocol.     Benign Prostatic Hypertrophy Medications      Protocol Criteria:  Appointment scheduled in the past 12 months or in the next 2 months  If patients is between the ages of 50 and 70 then PSA done within the last 2 years and is either  Less than or equal to 4.0 ng/ml  Or if greater than 4.0 there is no significant increase (>0.5 ng/ml) in PSA over the last 2 years    Recent Outpatient Visits              6 months ago [facility]    [facility] Visit    7 months ago Medicare annual wellness visit, subsequent    [facility]     [facility]    9 months ago Elevated PSA    [facility]    Office Visit    1 year ago Need for shingles vaccine    [facility]     Office Visit    2 years ago Pain in right hand    [facility]    [facility]          Future Appointments         Provider Department Appt Notes    In 2 weeks [facility]                PSA:    Lab Results   Component Value Date    PSA 4.07 (H) 03/05/2024    PSA 2.7 02/06/2017       PSA Screen:    Lab Results   Component Value Date    PSAS 4.97 (H) 10/11/2023    PSAS 3.55 09/17/2020

## 2024-09-10 ENCOUNTER — TELEPHONE (OUTPATIENT)
Dept: SURGERY | Facility: CLINIC | Age: 74
End: 2024-09-10

## 2024-09-10 NOTE — TELEPHONE ENCOUNTER
Spoke with patients spouse. After reviewing patients chart it looks like he was supposed to be scheduled for a URONAV procedure. She states she advised the  that patient was going to be going out of town until September, and that our office would reach out to reschedule for September.     Please advise.

## 2024-09-10 NOTE — TELEPHONE ENCOUNTER
Patients wife would like to speak to the nurse to find out what test the patient will need to get done this month?

## 2024-09-10 NOTE — TELEPHONE ENCOUNTER
Patients wife was contacted and was informed that I would be following up with her tomorrow as we are trying to figure out UroNav schedule.

## 2024-09-16 NOTE — TELEPHONE ENCOUNTER
Patient contacted Uronav scheduled for 11/11/24 at 10:30am arrival time. 2 week post visit made for 12/3/2024 at 2:20pm with SANJEEV. All instructions were reviewed and sent through FileThis.

## 2024-11-11 ENCOUNTER — PROCEDURE (OUTPATIENT)
Dept: SURGERY | Facility: CLINIC | Age: 74
End: 2024-11-11
Payer: MEDICARE

## 2024-11-11 VITALS
WEIGHT: 240 LBS | SYSTOLIC BLOOD PRESSURE: 134 MMHG | HEIGHT: 68 IN | DIASTOLIC BLOOD PRESSURE: 80 MMHG | BODY MASS INDEX: 36.37 KG/M2 | HEART RATE: 67 BPM

## 2024-11-11 DIAGNOSIS — R97.20 ELEVATED PSA: Primary | ICD-10-CM

## 2024-11-11 NOTE — PROGRESS NOTES
Girish Naranjo is a 74 year old male.    HPI:     Chief Complaint   Patient presents with    Procedure     Uronav procedure.       74-year-old male presents for MRI ultrasound fusion prostate biopsy and follow-up to a visit in the office March 5, 2024 for an elevated serum PSA and abnormal prostate MRI  June 2024 showing PI-RADS 4 classification lesion at the right lateral apex.    HISTORY:  Past Medical History:    Arrhythmia    R BBB    Colon polyp    Congestive heart disease (HCC)    Gall stones    Gastric polyp    Gastritis    High blood pressure    High cholesterol    Hyperlipidemia    Obesity    Systolic CHF, chronic (HCC)    Ulcer of the stomach and intestine      Past Surgical History:   Procedure Laterality Date    Cholecystectomy  11/12/14    Colonoscopy screening - referral N/A 10/18/2022    Procedure: COLONOSCOPY-SCREENING;  Surgeon: Jagdish Dunbar MD;  Location: [facility] ENDOSCOPY      Family History   Problem Relation Age of Onset    Diabetes Mother     Diabetes Brother       Social History:   Social History     Socioeconomic History    Marital status:    Occupational History    Occupation:    Tobacco Use    Smoking status: Former     Current packs/day: 0.40     Average packs/day: 0.4 packs/day for 20.0 years (8.0 ttl pk-yrs)     Types: Cigarettes    Smokeless tobacco: Never   Vaping Use    Vaping status: Never Used   Substance and Sexual Activity    Alcohol use: No    Drug use: No     Social Drivers of Health      [facility]    Social Connections    [facility]    Housing Stability        Medications (Active prior to today's visit):  Current Outpatient Medications   Medication Sig Dispense Refill    TAMSULOSIN 0.4 MG Oral Cap TAKE 1 CAPSULE BY MOUTH EVERY DAY (0.4 MG TOTAL) 30 MINUTES AFTER THE SAME MEAL EACH DAY 90 capsule 3    allopurinol 300  MG Oral Tab Take 1 tablet (300 mg total) by mouth daily. 90 tablet 3    ciprofloxacin 500 MG Oral Tab Take 1 tab by mouth every 12 hrs for 3 days, starting in the morning the day before your procedure. 6 tablet 0    cefdinir 300 MG Oral Cap Take 1 cap by mouth every 12 hrs for 3 days, starting in the morning the day before your procedure. 6 capsule 0    hydroCHLOROthiazide 12.5 MG Oral Cap Take 1 capsule (12.5 mg total) by mouth daily. 90 capsule 3    atorvastatin 80 MG Oral Tab Take 1 tablet (80 mg total) by mouth daily. 90 tablet 3    levocetirizine 5 MG Oral Tab Take 1 tablet (5 mg total) by mouth every evening. 90 tablet 3    valsartan 40 MG Oral Tab Take 2 tablets (80 mg total) by mouth daily. 180 tablet 3    metoprolol succinate ER 25 MG Oral Tablet 24 Hr Take 1 tablet (25 mg total) by mouth 2 (two) times daily. 180 tablet 3    ASPIRIN LOW DOSE 81 MG Oral Tab EC TAKE 1 TABLET BY MOUTH EVERY DAY 90 tablet 1       Allergies:  Allergies[1]      ROS:       PHYSICAL EXAM:   Diagnosis:   Elevated PSA, abnormal prostate MRI  Procedure: MRI ultrasound fusion transrectal ultrasound and prostate biopsy.   Anesthesia: 2% viscous lidocaine gel, 1% lidocaine 7 Ml   Prostate Volume: 72.4 cm³  Prostate US abnormalities: None  Seminal Vesicles: Grossly unremarkable  Bladder: Not well-seen  Biopsies obtained: 3 from the region of interest, 12 additional random samples to each at the apex mid and base bilaterally  Areas biopsied: Right and left apex, mid and bases at mid and lateral areas each.   Complications: None  Blood loss: Minimal       ASSESSMENT/PLAN:   Assessment   Encounter Diagnosis   Name Primary?    Elevated PSA Yes       Recommend:  - Follow-up in 2 weeks.  - He will be notified of results once available.         Orders This Visit:  Orders Placed This Encounter   Procedures    Specimen to Pathology, Tissue       Meds This Visit:  Requested Prescriptions      No prescriptions requested or ordered in this encounter        Imaging & Referrals:  None     11/11/2024  Sohail Araujo MD               [1] No Known Allergies

## 2024-12-03 ENCOUNTER — OFFICE VISIT (OUTPATIENT)
Dept: SURGERY | Facility: CLINIC | Age: 74
End: 2024-12-03
Payer: MEDICARE

## 2024-12-03 DIAGNOSIS — R97.20 ELEVATED PSA: Primary | ICD-10-CM

## 2024-12-03 PROCEDURE — 99214 OFFICE O/P EST MOD 30 MIN: CPT | Performed by: UROLOGY

## 2024-12-03 NOTE — PROGRESS NOTES
Girish Naranjo is a 74 year old male.    HPI:     Chief Complaint   Patient presents with    Follow - Up     Pt presents to uronav biopsy results.        74-year-old male status post MRI ultrasound fusion biopsy November 11, 2024 in follow-up.  PSA March 2024 elevated at 4.07.  Prostate MRI June 2024 demonstrated a PI-RADS 4 lesion right peripheral zone measuring 7 mm in size.  Biopsy demonstrated no evidence of cancer.  Biopsy done through MRI ultrasound fusion.  He has been notified of these results previously.  Denies any problems related to the biopsy.  HISTORY:  Past Medical History:    Arrhythmia    R BBB    Colon polyp    Congestive heart disease (HCC)    Gall stones    Gastric polyp    Gastritis    High blood pressure    High cholesterol    Hyperlipidemia    Obesity    Systolic CHF, chronic (HCC)    Ulcer of the stomach and intestine      Past Surgical History:   Procedure Laterality Date    Cholecystectomy  11/12/14    Colonoscopy screening - referral N/A 10/18/2022    Procedure: COLONOSCOPY-SCREENING;  Surgeon: Jagdish Dunbar MD;  Location: [facility] ENDOSCOPY      Family History   Problem Relation Age of Onset    Diabetes Mother     Diabetes Brother       Social History:   Social History     Socioeconomic History    Marital status:    Occupational History    Occupation:    Tobacco Use    Smoking status: Former     Current packs/day: 0.40     Average packs/day: 0.4 packs/day for 20.0 years (8.0 ttl pk-yrs)     Types: Cigarettes    Smokeless tobacco: Never   Vaping Use    Vaping status: Never Used   Substance and Sexual Activity    Alcohol use: No    Drug use: No     Social Drivers of Health      Received [facility], [facility]    Social Connections    Received from [facility], [facility]    Housing Stability        Medications (Active prior to today's visit):  Current Outpatient Medications    Medication Sig Dispense Refill    TAMSULOSIN 0.4 MG Oral Cap TAKE 1 CAPSULE BY MOUTH EVERY DAY (0.4 MG TOTAL) 30 MINUTES AFTER THE SAME MEAL EACH DAY 90 capsule 3    allopurinol 300 MG Oral Tab Take 1 tablet (300 mg total) by mouth daily. 90 tablet 3    ciprofloxacin 500 MG Oral Tab Take 1 tab by mouth every 12 hrs for 3 days, starting in the morning the day before your procedure. 6 tablet 0    cefdinir 300 MG Oral Cap Take 1 cap by mouth every 12 hrs for 3 days, starting in the morning the day before your procedure. 6 capsule 0    hydroCHLOROthiazide 12.5 MG Oral Cap Take 1 capsule (12.5 mg total) by mouth daily. 90 capsule 3    atorvastatin 80 MG Oral Tab Take 1 tablet (80 mg total) by mouth daily. 90 tablet 3    levocetirizine 5 MG Oral Tab Take 1 tablet (5 mg total) by mouth every evening. 90 tablet 3    valsartan 40 MG Oral Tab Take 2 tablets (80 mg total) by mouth daily. 180 tablet 3    metoprolol succinate ER 25 MG Oral Tablet 24 Hr Take 1 tablet (25 mg total) by mouth 2 (two) times daily. 180 tablet 3    ASPIRIN LOW DOSE 81 MG Oral Tab EC TAKE 1 TABLET BY MOUTH EVERY DAY 90 tablet 1       Allergies:  Allergies[1]      ROS:       PHYSICAL EXAM:        ASSESSMENT/PLAN:   Assessment   Encounter Diagnosis   Name Primary?    Elevated PSA Yes       Reviewed findings at length with patient.  Discussed the importance of continued close clinical monitoring of his PSA.  I asked him to follow-up in 6 months with a repeat PSA 1 to 2 days prior to the visit.         Orders This Visit:  Orders Placed This Encounter   Procedures    PSA Diagnostic [E]       Meds This Visit:  Requested Prescriptions      No prescriptions requested or ordered in this encounter       Imaging & Referrals:  None     12/3/2024  Sohail Araujo MD               [1] No Known Allergies

## 2024-12-30 ENCOUNTER — LAB ENCOUNTER (OUTPATIENT)
Dept: LAB | Age: 74
End: 2024-12-30
Attending: NURSE PRACTITIONER
Payer: MEDICARE

## 2024-12-30 DIAGNOSIS — I25.10 CORONARY ATHEROSCLEROSIS OF NATIVE CORONARY ARTERY: ICD-10-CM

## 2024-12-30 DIAGNOSIS — R94.39 ABNORMAL BALLISTOCARDIOGRAM: Primary | ICD-10-CM

## 2024-12-30 LAB
ALBUMIN SERPL-MCNC: 4.3 G/DL (ref 3.2–4.8)
ALBUMIN/GLOB SERPL: 1.3 {RATIO} (ref 1–2)
ALP LIVER SERPL-CCNC: 132 U/L
ALT SERPL-CCNC: 32 U/L
ANION GAP SERPL CALC-SCNC: 6 MMOL/L (ref 0–18)
AST SERPL-CCNC: 24 U/L (ref ?–34)
BILIRUB SERPL-MCNC: 0.8 MG/DL (ref 0.2–1.1)
BUN BLD-MCNC: 17 MG/DL (ref 9–23)
BUN/CREAT SERPL: 10.1 (ref 10–20)
CALCIUM BLD-MCNC: 9.7 MG/DL (ref 8.7–10.4)
CHLORIDE SERPL-SCNC: 106 MMOL/L (ref 98–112)
CHOLEST SERPL-MCNC: 122 MG/DL (ref ?–200)
CO2 SERPL-SCNC: 30 MMOL/L (ref 21–32)
CREAT BLD-MCNC: 1.68 MG/DL
EGFRCR SERPLBLD CKD-EPI 2021: 43 ML/MIN/1.73M2 (ref 60–?)
FASTING PATIENT LIPID ANSWER: YES
FASTING STATUS PATIENT QL REPORTED: YES
GLOBULIN PLAS-MCNC: 3.3 G/DL (ref 2–3.5)
GLUCOSE BLD-MCNC: 140 MG/DL (ref 70–99)
HDLC SERPL-MCNC: 37 MG/DL (ref 40–59)
LDLC SERPL CALC-MCNC: 65 MG/DL (ref ?–100)
NONHDLC SERPL-MCNC: 85 MG/DL (ref ?–130)
OSMOLALITY SERPL CALC.SUM OF ELEC: 298 MOSM/KG (ref 275–295)
POTASSIUM SERPL-SCNC: 3.9 MMOL/L (ref 3.5–5.1)
PROT SERPL-MCNC: 7.6 G/DL (ref 5.7–8.2)
SODIUM SERPL-SCNC: 142 MMOL/L (ref 136–145)
TRIGL SERPL-MCNC: 110 MG/DL (ref 30–149)
VLDLC SERPL CALC-MCNC: 16 MG/DL (ref 0–30)

## 2024-12-30 PROCEDURE — 80053 COMPREHEN METABOLIC PANEL: CPT

## 2024-12-30 PROCEDURE — 80061 LIPID PANEL: CPT

## 2024-12-30 PROCEDURE — 36415 COLL VENOUS BLD VENIPUNCTURE: CPT

## 2025-01-16 ENCOUNTER — OFFICE VISIT (OUTPATIENT)
Dept: FAMILY MEDICINE CLINIC | Facility: CLINIC | Age: 75
End: 2025-01-16
Payer: MEDICARE

## 2025-01-16 ENCOUNTER — LAB ENCOUNTER (OUTPATIENT)
Dept: LAB | Age: 75
End: 2025-01-16
Attending: FAMILY MEDICINE
Payer: MEDICARE

## 2025-01-16 VITALS
OXYGEN SATURATION: 98 % | DIASTOLIC BLOOD PRESSURE: 81 MMHG | SYSTOLIC BLOOD PRESSURE: 118 MMHG | TEMPERATURE: 98 F | BODY MASS INDEX: 38.14 KG/M2 | HEIGHT: 67 IN | RESPIRATION RATE: 18 BRPM | HEART RATE: 57 BPM | WEIGHT: 243 LBS

## 2025-01-16 DIAGNOSIS — Z28.21 INFLUENZA VACCINATION DECLINED BY PATIENT: ICD-10-CM

## 2025-01-16 DIAGNOSIS — Z12.5 ENCOUNTER FOR PROSTATE CANCER SCREENING: ICD-10-CM

## 2025-01-16 DIAGNOSIS — Z00.00 MEDICARE ANNUAL WELLNESS VISIT, SUBSEQUENT: ICD-10-CM

## 2025-01-16 DIAGNOSIS — N28.9 RENAL INSUFFICIENCY: ICD-10-CM

## 2025-01-16 DIAGNOSIS — Z00.00 MEDICARE ANNUAL WELLNESS VISIT, SUBSEQUENT: Primary | ICD-10-CM

## 2025-01-16 DIAGNOSIS — Z28.21 PNEUMOCOCCAL VACCINATION DECLINED BY PATIENT: ICD-10-CM

## 2025-01-16 DIAGNOSIS — Z28.21 VARICELLA ZOSTER VIRUS (VZV) VACCINATION DECLINED: ICD-10-CM

## 2025-01-16 DIAGNOSIS — I10 ESSENTIAL HYPERTENSION: ICD-10-CM

## 2025-01-16 DIAGNOSIS — E78.5 HYPERLIPIDEMIA, UNSPECIFIED HYPERLIPIDEMIA TYPE: ICD-10-CM

## 2025-01-16 DIAGNOSIS — I42.9 CARDIOMYOPATHY, UNSPECIFIED TYPE (HCC): ICD-10-CM

## 2025-01-16 DIAGNOSIS — M10.9 GOUT, UNSPECIFIED CAUSE, UNSPECIFIED CHRONICITY, UNSPECIFIED SITE: ICD-10-CM

## 2025-01-16 LAB
ALBUMIN SERPL-MCNC: 4.4 G/DL (ref 3.2–4.8)
ALBUMIN/GLOB SERPL: 1.2 {RATIO} (ref 1–2)
ALP LIVER SERPL-CCNC: 137 U/L
ALT SERPL-CCNC: 35 U/L
ANION GAP SERPL CALC-SCNC: 6 MMOL/L (ref 0–18)
AST SERPL-CCNC: 27 U/L (ref ?–34)
BASOPHILS # BLD AUTO: 0.07 X10(3) UL (ref 0–0.2)
BASOPHILS NFR BLD AUTO: 0.9 %
BILIRUB SERPL-MCNC: 1.2 MG/DL (ref 0.2–1.1)
BILIRUB UR QL: NEGATIVE
BUN BLD-MCNC: 16 MG/DL (ref 9–23)
BUN/CREAT SERPL: 10.2 (ref 10–20)
CALCIUM BLD-MCNC: 10.1 MG/DL (ref 8.7–10.4)
CHLORIDE SERPL-SCNC: 105 MMOL/L (ref 98–112)
CHOLEST SERPL-MCNC: 128 MG/DL (ref ?–200)
CLARITY UR: CLEAR
CO2 SERPL-SCNC: 30 MMOL/L (ref 21–32)
COMPLEXED PSA SERPL-MCNC: 4.69 NG/ML (ref ?–4)
CREAT BLD-MCNC: 1.57 MG/DL
DEPRECATED RDW RBC AUTO: 47.3 FL (ref 35.1–46.3)
EGFRCR SERPLBLD CKD-EPI 2021: 47 ML/MIN/1.73M2 (ref 60–?)
EOSINOPHIL # BLD AUTO: 0.43 X10(3) UL (ref 0–0.7)
EOSINOPHIL NFR BLD AUTO: 5.6 %
ERYTHROCYTE [DISTWIDTH] IN BLOOD BY AUTOMATED COUNT: 14.8 % (ref 11–15)
FASTING PATIENT LIPID ANSWER: NO
FASTING STATUS PATIENT QL REPORTED: NO
GLOBULIN PLAS-MCNC: 3.6 G/DL (ref 2–3.5)
GLUCOSE BLD-MCNC: 121 MG/DL (ref 70–99)
GLUCOSE UR-MCNC: NORMAL MG/DL
HCT VFR BLD AUTO: 49.3 %
HDLC SERPL-MCNC: 39 MG/DL (ref 40–59)
HGB BLD-MCNC: 16.4 G/DL
HGB UR QL STRIP.AUTO: NEGATIVE
IMM GRANULOCYTES # BLD AUTO: 0.01 X10(3) UL (ref 0–1)
IMM GRANULOCYTES NFR BLD: 0.1 %
KETONES UR-MCNC: NEGATIVE MG/DL
LDLC SERPL CALC-MCNC: 70 MG/DL (ref ?–100)
LEUKOCYTE ESTERASE UR QL STRIP.AUTO: NEGATIVE
LYMPHOCYTES # BLD AUTO: 3.07 X10(3) UL (ref 1–4)
LYMPHOCYTES NFR BLD AUTO: 40.2 %
MCH RBC QN AUTO: 29 PG (ref 26–34)
MCHC RBC AUTO-ENTMCNC: 33.3 G/DL (ref 31–37)
MCV RBC AUTO: 87.1 FL
MONOCYTES # BLD AUTO: 0.93 X10(3) UL (ref 0.1–1)
MONOCYTES NFR BLD AUTO: 12.2 %
NEUTROPHILS # BLD AUTO: 3.12 X10 (3) UL (ref 1.5–7.7)
NEUTROPHILS # BLD AUTO: 3.12 X10(3) UL (ref 1.5–7.7)
NEUTROPHILS NFR BLD AUTO: 41 %
NITRITE UR QL STRIP.AUTO: NEGATIVE
NONHDLC SERPL-MCNC: 89 MG/DL (ref ?–130)
OSMOLALITY SERPL CALC.SUM OF ELEC: 294 MOSM/KG (ref 275–295)
PH UR: 5 [PH] (ref 5–8)
PLATELET # BLD AUTO: 173 10(3)UL (ref 150–450)
POTASSIUM SERPL-SCNC: 4.5 MMOL/L (ref 3.5–5.1)
PROT SERPL-MCNC: 8 G/DL (ref 5.7–8.2)
PROT UR-MCNC: NEGATIVE MG/DL
RBC # BLD AUTO: 5.66 X10(6)UL
SODIUM SERPL-SCNC: 141 MMOL/L (ref 136–145)
SP GR UR STRIP: 1.01 (ref 1–1.03)
TRIGL SERPL-MCNC: 99 MG/DL (ref 30–149)
TSI SER-ACNC: 1.79 UIU/ML (ref 0.55–4.78)
UROBILINOGEN UR STRIP-ACNC: NORMAL
VLDLC SERPL CALC-MCNC: 15 MG/DL (ref 0–30)
WBC # BLD AUTO: 7.6 X10(3) UL (ref 4–11)

## 2025-01-16 PROCEDURE — 81003 URINALYSIS AUTO W/O SCOPE: CPT

## 2025-01-16 PROCEDURE — 80061 LIPID PANEL: CPT

## 2025-01-16 PROCEDURE — 84443 ASSAY THYROID STIM HORMONE: CPT

## 2025-01-16 PROCEDURE — 80053 COMPREHEN METABOLIC PANEL: CPT

## 2025-01-16 PROCEDURE — 36415 COLL VENOUS BLD VENIPUNCTURE: CPT

## 2025-01-16 PROCEDURE — 85025 COMPLETE CBC W/AUTO DIFF WBC: CPT

## 2025-01-16 RX ORDER — ALLOPURINOL 300 MG/1
300 TABLET ORAL DAILY
Qty: 90 TABLET | Refills: 3 | Status: SHIPPED | OUTPATIENT
Start: 2025-01-16

## 2025-01-16 NOTE — PATIENT INSTRUCTIONS
All adult screening ordered and done appropriate for patient's age and gender and risk factors and complaints.  Recommend weight loss via daily exercising and consistent healthy dietary changes.  Encouraged physical fitness and daily physical activity daily.  Monitor blood pressures and record at home. Limit salt intake.  Medication reviewed and renewed where needed and appropriate.  Comply with medications.

## 2025-01-16 NOTE — PROGRESS NOTES
Subjective:     Patient ID: Girish Naranjo is a 74 year old male.    This patient is a well-established 74-year-old hypertensive/benign hypertrophied prostatic diseased/cardiomyopathy patient under the care of cardiology/gout patient -American male here for Medicare annual visit which will also satisfy all the requirements for a for complete preventive care physical and for status update on any confirmed chronic medical illnesses and follow up on any previous labs or procedures that were suggestive or in need of further work up. Colonoscopy is current. Bowel and bladder functions are intact.    Patient currently denies headaches, standings, acute visual changes, and/or exertional fatigue.    Patient does report intermittent chest congestion over the last several weeks but has improved and appears to be extremely comfortable at this time during this encounter.    Patient declines on pneumococcal and influenza and shingles vaccine.    .         History/Other:   Review of Systems  Current Outpatient Medications   Medication Sig Dispense Refill    Zoster Vac Recomb Adjuvanted 50 MCG/0.5ML Intramuscular Recon Susp Inject 50 mcg into the muscle once for 1 dose. Please administer vaccine at pharmacy 1 each 1    allopurinol 300 MG Oral Tab Take 1 tablet (300 mg total) by mouth daily. 90 tablet 3    TAMSULOSIN 0.4 MG Oral Cap TAKE 1 CAPSULE BY MOUTH EVERY DAY (0.4 MG TOTAL) 30 MINUTES AFTER THE SAME MEAL EACH DAY 90 capsule 3    ciprofloxacin 500 MG Oral Tab Take 1 tab by mouth every 12 hrs for 3 days, starting in the morning the day before your procedure. 6 tablet 0    cefdinir 300 MG Oral Cap Take 1 cap by mouth every 12 hrs for 3 days, starting in the morning the day before your procedure. 6 capsule 0    hydroCHLOROthiazide 12.5 MG Oral Cap Take 1 capsule (12.5 mg total) by mouth daily. 90 capsule 3    atorvastatin 80 MG Oral Tab Take 1 tablet (80 mg total) by mouth daily. 90 tablet 3    levocetirizine 5 MG Oral  Tab Take 1 tablet (5 mg total) by mouth every evening. 90 tablet 3    valsartan 40 MG Oral Tab Take 2 tablets (80 mg total) by mouth daily. 180 tablet 3    metoprolol succinate ER 25 MG Oral Tablet 24 Hr Take 1 tablet (25 mg total) by mouth 2 (two) times daily. 180 tablet 3    ASPIRIN LOW DOSE 81 MG Oral Tab EC TAKE 1 TABLET BY MOUTH EVERY DAY 90 tablet 1     Allergies:Allergies[1]    Past Medical History:    Arrhythmia    R BBB    Colon polyp    Congestive heart disease (HCC)    Gall stones    Gastric polyp    Gastritis    High blood pressure    High cholesterol    Hyperlipidemia    Obesity    Systolic CHF, chronic (HCC)    Ulcer of the stomach and intestine      Past Surgical History:   Procedure Laterality Date    Cholecystectomy  11/12/14    Colonoscopy screening - referral N/A 10/18/2022    Procedure: COLONOSCOPY-SCREENING;  Surgeon: Jagdish Dunbar MD;  Location: [facility] ENDOSCOPY      Family History   Problem Relation Age of Onset    Diabetes Mother     Diabetes Brother       Social History:   Social History     Socioeconomic History    Marital status:    Occupational History    Occupation:    Tobacco Use    Smoking status: Former     Current packs/day: 0.40     Average packs/day: 0.4 packs/day for 20.0 years (8.0 ttl pk-yrs)     Types: Cigarettes    Smokeless tobacco: Never   Vaping Use    Vaping status: Never Used   Substance and Sexual Activity    Alcohol use: No    Drug use: No     Social Drivers of Health      Received [facility], [facility]    Social Connections    Received from [facility], [facility]    Housing Stability        Girish Naranjo's SCREENING SCHEDULE   Tests on this list are recommended by your physician but may not be covered, or covered at this frequency, by your insurer. Please check with your insurance carrier before scheduling to verify coverage.    PREVENTATIVE  SERVICES  INDICATIONS AND SCHEDULE Internal Lab or Procedure External Lab or Procedure   Diabetes Screening      HbgA1C   Annually HgbA1C (%)   Date Value   09/17/2020 6.3 (H)         No data to display                Fasting Blood Sugar (FSB) Annually Glucose (mg/dL)   Date Value   01/16/2025 121 (H)       Cardiovascular Disease Screening     LDL Annually LDL Cholesterol (mg/dL)   Date Value   01/16/2025 70        EKG One Time      Colorectal Cancer Screening      Colonoscopy Screen every 10 years Health Maintenance   Topic Date Due    Colorectal Cancer Screening  10/18/2027    Update Health Maintenance if applicable    Flex Sigmoidoscopy Screen every 5 years No results found for this or any previous visit.      No data to display                 Fecal Occult Blood Annually No results found for: \"FOB\", \"OCCULTSTOOL\"      No data to display                Glaucoma Screening      Ophthalmology Visit Annually      Prostate Cancer Screening      PSA  Annually Health Maintenance   Topic Date Due    PSA  01/16/2027     Update Health Maintenance if applicable   Immunizations      Influenza No orders found for this or any previous visit. Update Immunization Activity if applicable    Pneumococcal No orders found for this or any previous visit. Update Immunization Activity if applicable    Hepatitis B No orders found for this or any previous visit. Update Immunization Activity if applicable    Tetanus No orders found for this or any previous visit. Update Immunization Activity if applicable    Zoster (Not covered by Medicare Part B) No orders found for this or any previous visit. Update Immunization Activity if applicable     SPECIFIC DISEASE MONITORING Internal Lab or Procedure External Lab or Procedure   Annual Monitoring of Persistent     Medications (ACE/ARB, digoxin, diuretics)    Potassium  Annually Potassium (mmol/L)   Date Value   01/16/2025 4.5     POTASSIUM (P) (mmol/L)   Date Value   10/06/2014 4.6         No data  to display                Creatinine  Annually Creatinine (mg/dL)   Date Value   01/16/2025 1.57 (H)         No data to display                Digoxin Serum Conc  Annually No results found for: \"DIGOXIN\"      No data to display                Diabetes      HgbA1C  Annually HgbA1C (%)   Date Value   09/17/2020 6.3 (H)         No data to display                Creat/alb ratio  Annually      LDL  Annually LDL Cholesterol (mg/dL)   Date Value   01/16/2025 70         No data to display                 Dilated Eye exam  Annually      No data to display                   No data to display                COPD      Spirometry Testing Annually No results found for this or any previous visit.      No data to display                    General Health     In the past six months, have you lost more than 10 pounds without trying?: (Patient-Rptd) 2 - No    Has your appetite been poor?: (Patient-Rptd) No    Type of Diet: (Patient-Rptd) Balanced    How does the patient maintain a good energy level?: (Patient-Rptd) Other    How would you describe your daily physical activity?: (Patient-Rptd) None    How would you describe your current health state?: (Patient-Rptd) Fair    How do you maintain positive mental well-being?: (Patient-Rptd) Social Interaction         Have you had any immunizations at another office such as Influenza, Hepatitis B, Tetanus, or Pneumococcal?: (Patient-Rptd) No     Functional Ability     Bathing or Showering: (Patient-Rptd) Able without help    Toileting: (Patient-Rptd) Able without help    Dressing: (Patient-Rptd) Able without help    Eating: (Patient-Rptd) Able without help    Driving: (Patient-Rptd) Able without help    Preparing your meals: (Patient-Rptd) Able without help    Managing money/bills: (Patient-Rptd) Able without help    Taking medications as prescribed: (Patient-Rptd) Able without help    Are you able to afford your medications?: (Patient-Rptd) Yes    Hearing Problems?: (Patient-Rptd) Yes      Functional Status     Hearing Problems?: (Patient-Rptd) Yes    Vision Problems? : (Patient-Rptd) Yes    Difficulty walking?: (Patient-Rptd) No    Difficulty dressing or bathing?: (Patient-Rptd) No    Problems with daily activities? : (Patient-Rptd) No    Memory Problems?: (Patient-Rptd) No      Fall/Risk Assessment                                                              Depression Screening (PHQ-2/PHQ-9): Over the LAST 2 WEEKS                      Advance Directives     Do you have a healthcare power of ?: (Patient-Rptd) No    Do you have a living will?: (Patient-Rptd) No     Hearing Assessment (Required for AWV/SWV)      Hearing Screening    Screening Method: Questionnaire, Finger Rub  Finger Rub Result: Pass         Visual Acuity                   Cognitive Assessment     What day of the week is this?: Correct    What month is it?: Correct    What year is it?: Correct    Recall \"Ball\": Correct    Recall \"Flag\": Correct    Recall \"Tree\": Correct          Objective:   Vitals:    01/16/25 1418   BP: 118/81   Pulse:    Resp:    Temp:        Physical Exam  Constitutional:       General: He is not in acute distress.     Appearance: Normal appearance. He is not ill-appearing.   HENT:      Right Ear: Tympanic membrane normal.      Left Ear: Tympanic membrane normal.      Nose: Nose normal.      Mouth/Throat:      Mouth: Mucous membranes are moist.   Neck:      Thyroid: No thyromegaly.   Cardiovascular:      Rate and Rhythm: Normal rate and regular rhythm.      Heart sounds: Murmur heard.      No gallop.   Pulmonary:      Effort: Pulmonary effort is normal.      Breath sounds: Normal breath sounds.   Abdominal:      General: Bowel sounds are normal. There is no distension.      Palpations: There is no mass.   Neurological:      Mental Status: He is alert and oriented to person, place, and time.   Psychiatric:         Mood and Affect: Mood normal.         Assessment & Plan:   1. Medicare annual wellness  visit, subsequent  Survey completed and the following labs have been ordered.  - CBC W Differential W Platelet [E]; Future  - Comp Metabolic Panel (14) [E]; Future  - Lipid Panel [E]; Future  - TSH W Reflex To Free T4; Future  - Urinalysis, Routine [E]; Future    2. Cardiomyopathy, unspecified type (HCC)  Remains under the care of the cardiologist.    3. Essential hypertension  Measures to goal.  Compliance emphasized and encouraged.    4. Gout, unspecified cause, unspecified chronicity, unspecified site  Medication reviewed and renewed.  - allopurinol 300 MG Oral Tab; Take 1 tablet (300 mg total) by mouth daily.  Dispense: 90 tablet; Refill: 3    5. Hyperlipidemia, unspecified hyperlipidemia type  Status update on today.    6. Influenza vaccination declined by patient  Declined by patient.  - High Dose Fluzone trivalent influenza, 65yrs+ PFS (90662)    7. Renal insufficiency  Status update on today.    8. Pneumococcal vaccination declined by patient  Declined by patient.  - Prevnar 20 (PCV20) [41769]    9. Varicella zoster virus (VZV) vaccination declined  Declined by patient.  - Zoster Vac Recomb Adjuvanted 50 MCG/0.5ML Intramuscular Recon Susp; Inject 50 mcg into the muscle once for 1 dose. Please administer vaccine at pharmacy  Dispense: 1 each; Refill: 1    10. Encounter for prostate cancer screening  Ordered.  - PSA Total, Screen; Future      Orders Placed This Encounter   Procedures    CBC W Differential W Platelet [E]    Comp Metabolic Panel (14) [E]    Lipid Panel [E]    TSH W Reflex To Free T4    Urinalysis, Routine [E]    PSA Total, Screen    High Dose Fluzone trivalent influenza, 65yrs+ PFS (90662)    Prevnar 20 (PCV20) [94880]       Meds This Visit:  Requested Prescriptions     Signed Prescriptions Disp Refills    Zoster Vac Recomb Adjuvanted 50 MCG/0.5ML Intramuscular Recon Susp 1 each 1     Sig: Inject 50 mcg into the muscle once for 1 dose. Please administer vaccine at pharmacy    allopurinol 300 MG  Oral Tab 90 tablet 3     Sig: Take 1 tablet (300 mg total) by mouth daily.       Imaging & Referrals:  INFLUENZA VAC HIGH DOSE PRSV FREE  PCV20 VACCINE FOR INTRAMUSCULAR USE     Patient Instructions   All adult screening ordered and done appropriate for patient's age and gender and risk factors and complaints.  Recommend weight loss via daily exercising and consistent healthy dietary changes.  Encouraged physical fitness and daily physical activity daily.  Monitor blood pressures and record at home. Limit salt intake.  Medication reviewed and renewed where needed and appropriate.  Comply with medications.      Return in about 1 year (around 1/16/2026), or if symptoms worsen or fail to improve.         [1] No Known Allergies

## 2025-01-21 DIAGNOSIS — J30.9 ALLERGIC RHINITIS, UNSPECIFIED SEASONALITY, UNSPECIFIED TRIGGER: ICD-10-CM

## 2025-01-21 DIAGNOSIS — R05.8 ALLERGIC COUGH: ICD-10-CM

## 2025-01-23 NOTE — TELEPHONE ENCOUNTER
Please review pended refill request as unable to refill due to high/very high interaction warning copied here:    High  High Dose: levocetirizine, 5 mg, Oral, Every eveningSingle dose of 5 mg exceeds recommended maximum of 2.5 mg by 100%  Daily dose of 5 mg exceeds recommended maximum of 1.25 mg by 300%  Frequency of 1 doses/day exceeds recommended maximum of 0.5 doses/day  Details    Requested Prescriptions   Pending Prescriptions Disp Refills    LEVOCETIRIZINE 5 MG Oral Tab [Pharmacy Med Name: LEVOCETIRIZINE 5 MG TABLET] 90 tablet 3     Sig: TAKE 1 TABLET BY MOUTH EVERY DAY IN THE EVENING       Allergy Medication Protocol Passed - 1/23/2025  1:41 PM        Passed - In person appointment or virtual visit in the past 12 mos or appointment in next 3 mos     Recent Outpatient Visits              1 week ago Medicare annual wellness visit, subsequent    [facility], [facility] DO    Office Visit    1 month ago [facility]vated PSA    [facility]    [facility] Visit    10 months ago Elevated PSA    [facility]    Office Visit    1 year ago Medicare annual wellness visit, subsequent    [facility]    Office Visit    1 year ago Elevated PSA    [facility]    [facility] Visit                      Passed - Medication is active on med list                 Recent Outpatient Visits              1 week ago Medicare annual wellness visit, subsequent    [facility], [facility]    Office Visit    1 month [facility]    [facility]    10 months ago Elevated PSA    [facility]    Office Visit    1 year ago Medicare annual wellness visit, subsequent    [facility], [facility] Henrry Anderson, DO    Office Visit    1 year ago Elevated PSA    [facility], [facility], [facility] Sohail Araujo MD    Office Visit

## 2025-01-24 RX ORDER — LEVOCETIRIZINE DIHYDROCHLORIDE 5 MG/1
5 TABLET, FILM COATED ORAL EVERY EVENING
Qty: 90 TABLET | Refills: 3 | Status: SHIPPED | OUTPATIENT
Start: 2025-01-24

## 2025-02-17 DIAGNOSIS — I10 ESSENTIAL HYPERTENSION: ICD-10-CM

## 2025-02-21 RX ORDER — VALSARTAN 40 MG/1
80 TABLET ORAL DAILY
Qty: 180 TABLET | Refills: 3 | Status: SHIPPED | OUTPATIENT
Start: 2025-02-21

## 2025-02-21 NOTE — TELEPHONE ENCOUNTER
Please Review. Protocol Failed; No Protocol        GFR Evaluation  EGFRCR: 47 , resulted on 1/16/2025

## 2025-04-03 ENCOUNTER — OFFICE VISIT (OUTPATIENT)
Dept: FAMILY MEDICINE CLINIC | Facility: CLINIC | Age: 75
End: 2025-04-03
Payer: MEDICARE

## 2025-04-03 VITALS
TEMPERATURE: 98 F | BODY MASS INDEX: 38.92 KG/M2 | HEART RATE: 76 BPM | SYSTOLIC BLOOD PRESSURE: 118 MMHG | WEIGHT: 248 LBS | OXYGEN SATURATION: 93 % | DIASTOLIC BLOOD PRESSURE: 70 MMHG | HEIGHT: 67 IN | RESPIRATION RATE: 18 BRPM

## 2025-04-03 DIAGNOSIS — M54.50 ACUTE MIDLINE LOW BACK PAIN WITHOUT SCIATICA: Primary | ICD-10-CM

## 2025-04-03 DIAGNOSIS — M47.816 LUMBAR SPONDYLOSIS: ICD-10-CM

## 2025-04-03 DIAGNOSIS — I10 ESSENTIAL HYPERTENSION: ICD-10-CM

## 2025-04-03 LAB
BILIRUB UR QL: NEGATIVE
CLARITY UR: CLEAR
GLUCOSE UR-MCNC: NORMAL MG/DL
HGB UR QL STRIP.AUTO: NEGATIVE
KETONES UR-MCNC: NEGATIVE MG/DL
LEUKOCYTE ESTERASE UR QL STRIP.AUTO: NEGATIVE
NITRITE UR QL STRIP.AUTO: NEGATIVE
PH UR: 5.5 [PH] (ref 5–8)
PROT UR-MCNC: NEGATIVE MG/DL
SP GR UR STRIP: 1.01 (ref 1–1.03)
UROBILINOGEN UR STRIP-ACNC: NORMAL

## 2025-04-03 PROCEDURE — 99214 OFFICE O/P EST MOD 30 MIN: CPT | Performed by: FAMILY MEDICINE

## 2025-04-03 PROCEDURE — 3008F BODY MASS INDEX DOCD: CPT | Performed by: FAMILY MEDICINE

## 2025-04-03 PROCEDURE — 3074F SYST BP LT 130 MM HG: CPT | Performed by: FAMILY MEDICINE

## 2025-04-03 PROCEDURE — 1159F MED LIST DOCD IN RCRD: CPT | Performed by: FAMILY MEDICINE

## 2025-04-03 PROCEDURE — 3078F DIAST BP <80 MM HG: CPT | Performed by: FAMILY MEDICINE

## 2025-04-03 PROCEDURE — 1125F AMNT PAIN NOTED PAIN PRSNT: CPT | Performed by: FAMILY MEDICINE

## 2025-04-03 RX ORDER — METHYLPREDNISOLONE 4 MG/1
TABLET ORAL
Qty: 1 EACH | Refills: 0 | Status: SHIPPED | OUTPATIENT
Start: 2025-04-03

## 2025-04-03 RX ORDER — CYCLOBENZAPRINE HCL 5 MG
5 TABLET ORAL NIGHTLY
Qty: 30 TABLET | Refills: 0 | Status: SHIPPED | OUTPATIENT
Start: 2025-04-03

## 2025-04-09 NOTE — PROGRESS NOTES
Subjective:     Patient ID: Girish Naranjo is a 75 year old male.    This patient is a 75-year-old established obese by definition/hypertensive -American gentleman with a history of documented lumbar spondylosis via lumbar plain films who presents with a continuation of low back pain, but with an exacerbation and quality.  Patient denies fever, flank pain, nausea, vomiting, urinary frequency or urinary discomfort.  There is no discoloration in his urine.    Patient currently denies headaches, chest pain, dizziness, shortness of breath, acute visual changes, and/or exertional fatigue.              History/Other:   Review of Systems  Current Medications[1]  Allergies:Allergies[2]    Past Medical History[3]   Past Surgical History[4]   Family History[5]   Social History: Short Social Hx on File[6]     Objective:   Vitals:    04/03/25 0956   BP: 118/70   Pulse:    Resp:    Temp:        Physical Exam  Constitutional:       General: He is not in acute distress.     Appearance: He is obese. He is not ill-appearing.   Cardiovascular:      Rate and Rhythm: Normal rate and regular rhythm.      Heart sounds:      No gallop.   Pulmonary:      Effort: Pulmonary effort is normal. No respiratory distress.      Breath sounds: Normal breath sounds. No wheezing or rales.   Musculoskeletal:      Lumbar back: Spasms and tenderness present. Decreased range of motion.        Back:       Comments: Region of discomfort as depicted.   Neurological:      Mental Status: He is alert.         Assessment & Plan:   1. Acute midline low back pain without sciatica  The following labs have been ordered.  Muscle relaxant prescribed.  - Urinalysis with Culture Reflex; Future  - cyclobenzaprine 5 MG Oral Tab; Take 1 tablet (5 mg total) by mouth nightly.  Dispense: 30 tablet; Refill: 0  - Urinalysis with Culture Reflex    2. Lumbar spondylosis  History of low back spondylosis.  Confirmed via x-ray.  This is likely the cause for the patient's  exacerbation of low back pain.    3. Essential hypertension  Blood pressure measures to goal.  Patient has been encouraged to continue to comply with medication and minimize salt intake.  If he is able to exercise safely, he should do so 450 minutes/week.      Orders Placed This Encounter   Procedures    Urinalysis with Culture Reflex       Meds This Visit:  Requested Prescriptions     Signed Prescriptions Disp Refills    cyclobenzaprine 5 MG Oral Tab 30 tablet 0     Sig: Take 1 tablet (5 mg total) by mouth nightly.    methylPREDNISolone (MEDROL) 4 MG Oral Tablet Therapy Pack 1 each 0     Sig: As directed.       Imaging & Referrals:  None     Patient Instructions   Medrol and flexeril prescribed.  Low back passive stretch recommended.  Increase water intake.    Return if symptoms worsen or fail to improve.         [1]   Current Outpatient Medications   Medication Sig Dispense Refill    cyclobenzaprine 5 MG Oral Tab Take 1 tablet (5 mg total) by mouth nightly. 30 tablet 0    methylPREDNISolone (MEDROL) 4 MG Oral Tablet Therapy Pack As directed. 1 each 0    valsartan 40 MG Oral Tab Take 2 tablets (80 mg total) by mouth daily. 180 tablet 3    allopurinol 300 MG Oral Tab Take 1 tablet (300 mg total) by mouth daily. 90 tablet 3    TAMSULOSIN 0.4 MG Oral Cap TAKE 1 CAPSULE BY MOUTH EVERY DAY (0.4 MG TOTAL) 30 MINUTES AFTER THE SAME MEAL EACH DAY 90 capsule 3    ciprofloxacin 500 MG Oral Tab Take 1 tab by mouth every 12 hrs for 3 days, starting in the morning the day before your procedure. 6 tablet 0    cefdinir 300 MG Oral Cap Take 1 cap by mouth every 12 hrs for 3 days, starting in the morning the day before your procedure. 6 capsule 0    hydroCHLOROthiazide 12.5 MG Oral Cap Take 1 capsule (12.5 mg total) by mouth daily. 90 capsule 3    atorvastatin 80 MG Oral Tab Take 1 tablet (80 mg total) by mouth daily. 90 tablet 3    metoprolol succinate ER 25 MG Oral Tablet 24 Hr Take 1 tablet (25 mg total) by mouth 2 (two) times  daily. 180 tablet 3    ASPIRIN LOW DOSE 81 MG Oral Tab EC TAKE 1 TABLET BY MOUTH EVERY DAY 90 tablet 1    levocetirizine 5 MG Oral Tab Take 1 tablet (5 mg total) by mouth every evening. (Patient not taking: Reported on 4/3/2025) 90 tablet 3   [2] No Known Allergies  [3]   Past Medical History:   Arrhythmia    R BBB    Colon polyp    Congestive heart disease (HCC)    Gall stones    Gastric polyp    Gastritis    High blood pressure    High cholesterol    Hyperlipidemia    Obesity    Systolic CHF, chronic (HCC)    Ulcer of the stomach and intestine   [4]   Past Surgical History:  Procedure Laterality Date    Cholecystectomy  11/12/14    Colonoscopy screening - referral N/A 10/18/2022    Procedure: COLONOSCOPY-SCREENING;  Surgeon: Jagdish Dunbar MD;  Location: [facility] ENDOSCOPY   [5]   Family History  Problem Relation Age of Onset    Diabetes Mother     Diabetes Brother    [6]   Social History  Socioeconomic History    Marital status:    Occupational History    Occupation:    Tobacco Use    Smoking status: Former     Current packs/day: 0.40     Average packs/day: 0.4 packs/day for 20.0 years (8.0 ttl pk-yrs)     Types: Cigarettes    Smokeless tobacco: Never   Vaping Use    Vaping status: Never Used   Substance and Sexual Activity    Alcohol use: No    Drug use: No     Social Drivers of Health      Received from [facility]    Housing Stability

## 2025-05-22 DIAGNOSIS — I10 ESSENTIAL HYPERTENSION: ICD-10-CM

## 2025-05-23 RX ORDER — HYDROCHLOROTHIAZIDE 12.5 MG/1
12.5 CAPSULE ORAL DAILY
Qty: 90 CAPSULE | Refills: 3 | Status: SHIPPED | OUTPATIENT
Start: 2025-05-23

## 2025-06-24 ENCOUNTER — LAB ENCOUNTER (OUTPATIENT)
Dept: LAB | Age: 75
End: 2025-06-24
Attending: NURSE PRACTITIONER
Payer: MEDICARE

## 2025-06-24 DIAGNOSIS — E78.5 HYPERLIPIDEMIA: Primary | ICD-10-CM

## 2025-06-24 DIAGNOSIS — I10 HYPERTENSION: ICD-10-CM

## 2025-06-24 DIAGNOSIS — R97.20 ELEVATED PSA: ICD-10-CM

## 2025-06-24 LAB
ALBUMIN SERPL-MCNC: 4.2 G/DL (ref 3.2–4.8)
ALBUMIN/GLOB SERPL: 1.4 {RATIO} (ref 1–2)
ALP LIVER SERPL-CCNC: 134 U/L (ref 45–117)
ALT SERPL-CCNC: 30 U/L (ref 10–49)
ANION GAP SERPL CALC-SCNC: 8 MMOL/L (ref 0–18)
AST SERPL-CCNC: 25 U/L (ref ?–34)
BASOPHILS # BLD AUTO: 0.05 X10(3) UL (ref 0–0.2)
BASOPHILS NFR BLD AUTO: 0.8 %
BILIRUB SERPL-MCNC: 0.8 MG/DL (ref 0.2–1.1)
BUN BLD-MCNC: 20 MG/DL (ref 9–23)
BUN/CREAT SERPL: 11.6 (ref 10–20)
CALCIUM BLD-MCNC: 9.6 MG/DL (ref 8.7–10.4)
CHLORIDE SERPL-SCNC: 105 MMOL/L (ref 98–112)
CO2 SERPL-SCNC: 27 MMOL/L (ref 21–32)
CREAT BLD-MCNC: 1.73 MG/DL (ref 0.7–1.3)
DEPRECATED RDW RBC AUTO: 45.8 FL (ref 35.1–46.3)
EGFRCR SERPLBLD CKD-EPI 2021: 41 ML/MIN/1.73M2 (ref 60–?)
EOSINOPHIL # BLD AUTO: 0.36 X10(3) UL (ref 0–0.7)
EOSINOPHIL NFR BLD AUTO: 6 %
ERYTHROCYTE [DISTWIDTH] IN BLOOD BY AUTOMATED COUNT: 14.4 % (ref 11–15)
FASTING STATUS PATIENT QL REPORTED: NO
GLOBULIN PLAS-MCNC: 3.1 G/DL (ref 2–3.5)
GLUCOSE BLD-MCNC: 148 MG/DL (ref 70–99)
HCT VFR BLD AUTO: 45 % (ref 39–53)
HGB BLD-MCNC: 14.5 G/DL (ref 13–17.5)
IMM GRANULOCYTES # BLD AUTO: 0.01 X10(3) UL (ref 0–1)
IMM GRANULOCYTES NFR BLD: 0.2 %
LYMPHOCYTES # BLD AUTO: 2.34 X10(3) UL (ref 1–4)
LYMPHOCYTES NFR BLD AUTO: 38.7 %
MCH RBC QN AUTO: 28.1 PG (ref 26–34)
MCHC RBC AUTO-ENTMCNC: 32.2 G/DL (ref 31–37)
MCV RBC AUTO: 87.2 FL (ref 80–100)
MONOCYTES # BLD AUTO: 0.81 X10(3) UL (ref 0.1–1)
MONOCYTES NFR BLD AUTO: 13.4 %
NEUTROPHILS # BLD AUTO: 2.47 X10 (3) UL (ref 1.5–7.7)
NEUTROPHILS # BLD AUTO: 2.47 X10(3) UL (ref 1.5–7.7)
NEUTROPHILS NFR BLD AUTO: 40.9 %
OSMOLALITY SERPL CALC.SUM OF ELEC: 295 MOSM/KG (ref 275–295)
PLATELET # BLD AUTO: 193 10(3)UL (ref 150–450)
POTASSIUM SERPL-SCNC: 3.7 MMOL/L (ref 3.5–5.1)
PROT SERPL-MCNC: 7.3 G/DL (ref 5.7–8.2)
PSA SERPL-MCNC: 5.2 NG/ML (ref ?–4)
RBC # BLD AUTO: 5.16 X10(6)UL (ref 3.8–5.8)
SODIUM SERPL-SCNC: 140 MMOL/L (ref 136–145)
WBC # BLD AUTO: 6 X10(3) UL (ref 4–11)

## 2025-06-24 PROCEDURE — 80053 COMPREHEN METABOLIC PANEL: CPT

## 2025-06-24 PROCEDURE — 36415 COLL VENOUS BLD VENIPUNCTURE: CPT

## 2025-06-24 PROCEDURE — 85025 COMPLETE CBC W/AUTO DIFF WBC: CPT

## 2025-06-24 PROCEDURE — 84153 ASSAY OF PSA TOTAL: CPT

## 2025-07-30 ENCOUNTER — TELEPHONE (OUTPATIENT)
Dept: CASE MANAGEMENT | Age: 75
End: 2025-07-30

## 2025-07-30 DIAGNOSIS — R97.20 ELEVATED PSA: Primary | ICD-10-CM

## 2025-08-05 ENCOUNTER — OFFICE VISIT (OUTPATIENT)
Dept: SURGERY | Facility: CLINIC | Age: 75
End: 2025-08-05

## 2025-08-05 DIAGNOSIS — R97.20 ELEVATED PSA: ICD-10-CM

## 2025-08-05 DIAGNOSIS — N13.8 BPH WITH OBSTRUCTION/LOWER URINARY TRACT SYMPTOMS: Primary | ICD-10-CM

## 2025-08-05 DIAGNOSIS — N40.1 BPH WITH OBSTRUCTION/LOWER URINARY TRACT SYMPTOMS: Primary | ICD-10-CM

## 2025-08-05 PROCEDURE — 99213 OFFICE O/P EST LOW 20 MIN: CPT | Performed by: PHYSICIAN ASSISTANT

## 2025-08-05 PROCEDURE — 1160F RVW MEDS BY RX/DR IN RCRD: CPT | Performed by: PHYSICIAN ASSISTANT

## 2025-08-05 PROCEDURE — 1159F MED LIST DOCD IN RCRD: CPT | Performed by: PHYSICIAN ASSISTANT

## 2025-08-27 ENCOUNTER — OFFICE VISIT (OUTPATIENT)
Dept: FAMILY MEDICINE CLINIC | Facility: CLINIC | Age: 75
End: 2025-08-27

## 2025-08-27 VITALS
OXYGEN SATURATION: 95 % | HEART RATE: 71 BPM | SYSTOLIC BLOOD PRESSURE: 119 MMHG | TEMPERATURE: 99 F | RESPIRATION RATE: 18 BRPM | DIASTOLIC BLOOD PRESSURE: 65 MMHG | HEIGHT: 67 IN | BODY MASS INDEX: 38.77 KG/M2 | WEIGHT: 247 LBS

## 2025-08-27 DIAGNOSIS — I10 PRIMARY HYPERTENSION: ICD-10-CM

## 2025-08-27 DIAGNOSIS — M99.01 CERVICOTHORACIC SOMATIC DYSFUNCTION: ICD-10-CM

## 2025-08-27 DIAGNOSIS — H91.93 DECREASED HEARING OF BOTH EARS: ICD-10-CM

## 2025-08-27 DIAGNOSIS — E66.01 CLASS 2 SEVERE OBESITY WITH SERIOUS COMORBIDITY AND BODY MASS INDEX (BMI) OF 38.0 TO 38.9 IN ADULT, UNSPECIFIED OBESITY TYPE (HCC): ICD-10-CM

## 2025-08-27 DIAGNOSIS — E66.812 CLASS 2 SEVERE OBESITY WITH SERIOUS COMORBIDITY AND BODY MASS INDEX (BMI) OF 38.0 TO 38.9 IN ADULT, UNSPECIFIED OBESITY TYPE (HCC): ICD-10-CM

## 2025-08-27 DIAGNOSIS — M54.2 CERVICALGIA: Primary | ICD-10-CM

## 2025-08-27 PROCEDURE — 1159F MED LIST DOCD IN RCRD: CPT | Performed by: FAMILY MEDICINE

## 2025-08-27 PROCEDURE — 3074F SYST BP LT 130 MM HG: CPT | Performed by: FAMILY MEDICINE

## 2025-08-27 PROCEDURE — 3078F DIAST BP <80 MM HG: CPT | Performed by: FAMILY MEDICINE

## 2025-08-27 PROCEDURE — 98925 OSTEOPATH MANJ 1-2 REGIONS: CPT | Performed by: FAMILY MEDICINE

## 2025-08-27 PROCEDURE — 1125F AMNT PAIN NOTED PAIN PRSNT: CPT | Performed by: FAMILY MEDICINE

## 2025-08-27 PROCEDURE — 99214 OFFICE O/P EST MOD 30 MIN: CPT | Performed by: FAMILY MEDICINE

## 2025-08-27 PROCEDURE — 3008F BODY MASS INDEX DOCD: CPT | Performed by: FAMILY MEDICINE

## 2025-08-27 RX ORDER — CYCLOBENZAPRINE HCL 5 MG
5 TABLET ORAL NIGHTLY
Qty: 30 TABLET | Refills: 0 | Status: SHIPPED | OUTPATIENT
Start: 2025-08-27

## (undated) DIAGNOSIS — I10 ESSENTIAL HYPERTENSION: ICD-10-CM

## (undated) DEVICE — KIT ENDO ORCAPOD 160/180/190

## (undated) DEVICE — 35 ML SYRINGE REGULAR TIP: Brand: MONOJECT

## (undated) DEVICE — MEDI-VAC NON-CONDUCTIVE SUCTION TUBING 6MM X 1.8M (6FT.) L: Brand: CARDINAL HEALTH

## (undated) DEVICE — STERILE LATEX POWDER-FREE SURGICAL GLOVESWITH NITRILE COATING: Brand: PROTEXIS

## (undated) DEVICE — KIT CLEAN ENDOKIT 1.1OZ GOWNX2

## (undated) DEVICE — SNARE OPTMZ PLPCTM TRP

## (undated) DEVICE — LINE MNTR ADLT SET O2 INTMD

## (undated) DEVICE — TRAP MCS 40ML 5IN PLS SCR CAP

## (undated) DEVICE — SNARE ENDOSCOPIC 10MM ROUND

## (undated) NOTE — MR AVS SNAPSHOT
[facility]  [address], [address]  [phone]               Thank you for choosing us for your health care visit with Tamra Scanlon DO.   We are glad to serve you and happy to provide you with this sum Take 1 tablet by mouth every 6 (six) hours as needed for Pain.    Commonly known as:  ULTRAM                Where to Get Your Medications      These medications were sent to [facility], [facility] Results of Recent Testing     ELECTROCARDIOGRAM, COMPLETE           CBC WITH DIFFERENTIAL WITH PLATELET           COMP METABOLIC PANEL (14)      Component Value Standard Range & Units    Glucose 100 70-99 mg/dL    Sodium 140 136-144 mmol/L    Potassium 4.6 Calculation is MOST accurate when individual's Triglyceride (TG) is <200. Calculation is COMPROMISED when TG is 200-400 mg/dl.     Calculation is INVALID when patient: is Non-Fasting, has TG > 400 mg/dl, Chylomicrons are present, Type III Hyperlipidemia, not sign up before the expiration date, you must request a new code. Your unique [employer] Access Code: [email or web address]  Expires: 4/8/2017  9:07 PM    If you have questions, you can call [phone] to talk to our [facility] Staff.  Remember, Morris womack

## (undated) NOTE — MR AVS SNAPSHOT
[facility] [address] SPECIALTY [facility] [place] [address] [address] [address] [phone]               Thank you for choosing us for your health care visit with Edward Kelley MD.  We are glad to serve you and happy to provide you with this summary of yo schedule your appointment. If you are confident that your benefit plan will not require a referral or authorization, such as [place] Medicaid, please feel free to schedule your appointment immediately.  However, if you are unsure about the requirements What changed:  Another medication with the same name was removed. Continue taking this medication, and follow the directions you see here. Commonly known as:   Toprol XL                Where to Get Your Medications      These medications were sent to [facility]

## (undated) NOTE — LETTER
[address], [place]  Authorization for Invasive Procedures  1.  I hereby authorize Dr. Susan Zaragoza , my physician and whomever may be designated as the doctor's assistant, to perform the following operation and/or procedure:  CARDIAC C 5. I consent to the photographing of the operations or procedures to be performed for the purposes of advancing medicine, science, and/or education, provided my identity is not revealed.  If the procedure has been videotaped, the physician/surgeon will obta __________ Time: ___________    Statement of Physician  My signature below affirms that prior to the time of the procedure, I have explained to the patient and/or his legal representative, the risks and benefits involved in the proposed treatment and any r

## (undated) NOTE — IP AVS SNAPSHOT
[address]  [address] [facility] [phone]                Discharge Summary   4/24/2017    Fransisca Huston           Admission Information        Provider Department    4/24/2017 Stephanie Tang MD [facility] Inter cardiac cath, angioplasty, or stent through the radial artery. Proper skin puncture site care is important during the healing period. This guideline should help to remind you of the verbal instructions you received from your physician or nurse.     Site: [address]   [phone]            Gagan 15, 2017 10:45 AM   Follow Up Visit with Kathya Crain MD   [facility] ([facility])    [address] [id number] harming yourself, contact [facility] at [phone]. - If you don’t have insurance, [facility] has partnered with Patient [address] to help you get signed up for insurance coverage.   Patient Bear your medications while at home.          Blood Pressure and Cardiac Medications     Metoprolol Succinate ER 25 MG Oral Tablet 24 Hr    Losartan Potassium-HCTZ 50-12.5 MG Oral Tab         Use: Treat abnormal blood pressure (high or low), cardiac conditions;

## (undated) NOTE — LETTER
Date & Time: 8/9/2020, 9:26 AM  Patient: Martha Agee  Encounter Provider(s):    AMY Guerrero       To Whom It May Concern:    Mary Huerta was seen and treated in our department on 8/4/2020. He should not return to work until 08/22/2020.

## (undated) NOTE — MR AVS SNAPSHOT
[facility] DIV[facility]  [address], [address]  [phone]               Thank you for choosing us for your health care visit with Lalita Shell DO.   We are glad to serve you and happy to provide you with this sum aspirin EC 81 MG Tbec   Take 1 tablet (81 mg total) by mouth daily. Atorvastatin Calcium 40 MG Tabs   Take 1 tablet (40 mg total) by mouth nightly.    Commonly known as:  LIPITOR           loratadine 10 MG Tabs   Take 1 tablet (10 mg

## (undated) NOTE — MR AVS SNAPSHOT
[facility] SPECIALTY [facility] - [place] [address] [address] [address] [phone]               Thank you for choosing us for your health care visit with Livier Mendosa MD.  We are glad to serve you and happy to provide you with this summary of yo [address] [facility] [address] [facility] [place], [address], [address] [facility]  [address]  [address] [place], [place]    [address]  [address] [facility] Commonly known as: Toprol XL           TraMADol HCl 50 MG Tabs   Take 1 tablet by mouth every 6 (six) hours as needed for Pain.    Commonly known as:  ULTRAM                Where to Get Your Medications      These medications were sent to [address]

## (undated) NOTE — MR AVS SNAPSHOT
[facility] SPECIALTY [facility] - [place] [address] [address] [id number] [phone]               Thank you for choosing us for your health care visit with SIGIFREDO Null.   We are glad to serve you and happy to provide you with this summary Commonly known as:  HYZAAR           Metoprolol Succinate ER 25 MG Tb24   Take 0.5 tablets (12.5 mg total) by mouth 2 (two) times daily. What changed:  additional instructions   Commonly known as:   Toprol XL                   MyChart     Sign up for [facility]

## (undated) NOTE — LETTER
11/28/2017              [facility]         Dear Scarlet Hogan,    [address] [facility] records indicate that the blood tests Lipids, AST, ALT ordered for you by Tang Garza MD  have not been done and were due in [facility]

## (undated) NOTE — MR AVS SNAPSHOT
[facility] - [place] [address] [address] [id number] [phone]               Thank you for choosing us for your health care visit with SIGIFREDO Sanchez.   We are glad to serve you and happy to provide you with this summary Sign up for [employer], your secure online medical record. [employer] will allow you to access patient instructions from your recent visit,  view other health information, and more. To sign up or find more information, go to [email or web address]. org and cl

## (undated) NOTE — LETTER
22      Patient: Albert Tirado  : 1950 Visit date: 2022    Dear Alba Deutsch,      I examined your patient in consultation today. Has diffuse bilateral hand arthritis, right much worse than left. Was started in therapy with range of motion, modalities, paraffin, and a home paraffin unit. Thank you for your kind referral. If I may answer any questions, please feel free to contact me.      Sincerely,   Hola Pavon MD     CC:   No Recipients